# Patient Record
Sex: FEMALE | Race: OTHER
[De-identification: names, ages, dates, MRNs, and addresses within clinical notes are randomized per-mention and may not be internally consistent; named-entity substitution may affect disease eponyms.]

---

## 2019-05-29 ENCOUNTER — APPOINTMENT (OUTPATIENT)
Dept: OBGYN | Facility: CLINIC | Age: 49
End: 2019-05-29
Payer: COMMERCIAL

## 2019-05-29 VITALS
SYSTOLIC BLOOD PRESSURE: 140 MMHG | HEIGHT: 59 IN | BODY MASS INDEX: 34.27 KG/M2 | DIASTOLIC BLOOD PRESSURE: 80 MMHG | WEIGHT: 170 LBS

## 2019-05-29 DIAGNOSIS — G43.909 MIGRAINE, UNSPECIFIED, NOT INTRACTABLE, W/OUT STATUS MIGRAINOSUS: ICD-10-CM

## 2019-05-29 DIAGNOSIS — J45.909 UNSPECIFIED ASTHMA, UNCOMPLICATED: ICD-10-CM

## 2019-05-29 DIAGNOSIS — Z00.00 ENCOUNTER FOR GENERAL ADULT MEDICAL EXAMINATION W/OUT ABNORMAL FINDINGS: ICD-10-CM

## 2019-05-29 DIAGNOSIS — N95.1 MENOPAUSAL AND FEMALE CLIMACTERIC STATES: ICD-10-CM

## 2019-05-29 DIAGNOSIS — E66.9 OBESITY, UNSPECIFIED: ICD-10-CM

## 2019-05-29 DIAGNOSIS — Z87.410 PERSONAL HISTORY OF CERVICAL DYSPLASIA: ICD-10-CM

## 2019-05-29 DIAGNOSIS — Z87.39 PERSONAL HISTORY OF OTHER DISEASES OF THE MUSCULOSKELETAL SYSTEM AND CONNECTIVE TISSUE: ICD-10-CM

## 2019-05-29 DIAGNOSIS — Z78.9 OTHER SPECIFIED HEALTH STATUS: ICD-10-CM

## 2019-05-29 PROCEDURE — 99202 OFFICE O/P NEW SF 15 MIN: CPT

## 2019-05-29 RX ORDER — CETIRIZINE HYDROCHLORIDE 10 MG/1
10 TABLET, FILM COATED ORAL
Refills: 0 | Status: ACTIVE | COMMUNITY

## 2019-05-29 RX ORDER — FLUTICASONE PROPIONATE 50 MCG
50 SPRAY, SUSPENSION NASAL
Refills: 0 | Status: ACTIVE | COMMUNITY

## 2019-05-29 RX ORDER — MONTELUKAST 10 MG/1
TABLET, FILM COATED ORAL
Refills: 0 | Status: ACTIVE | COMMUNITY

## 2019-05-29 RX ORDER — OMEPRAZOLE 40 MG/1
40 CAPSULE, DELAYED RELEASE ORAL
Refills: 0 | Status: ACTIVE | COMMUNITY

## 2019-05-29 RX ORDER — NAPROXEN 500 MG/1
500 TABLET ORAL
Refills: 0 | Status: ACTIVE | COMMUNITY

## 2019-05-29 RX ORDER — CONJUGATED ESTROGENS AND MEDROXYPROGESTERONE ACETATE .3; 1.5 MG/1; MG/1
0.3-1.5 TABLET, SUGAR COATED ORAL
Refills: 0 | Status: ACTIVE | COMMUNITY

## 2019-05-29 RX ORDER — ALBUTEROL SULFATE 90 UG/1
108 (90 BASE) AEROSOL, METERED RESPIRATORY (INHALATION)
Refills: 0 | Status: ACTIVE | COMMUNITY

## 2019-05-29 RX ORDER — BUDESONIDE AND FORMOTEROL FUMARATE DIHYDRATE 80; 4.5 UG/1; UG/1
80-4.5 AEROSOL RESPIRATORY (INHALATION)
Refills: 0 | Status: ACTIVE | COMMUNITY

## 2019-05-29 NOTE — HISTORY OF PRESENT ILLNESS
[Definite:  ___ (Date)] : the last menstrual period was [unfilled] [Normal Amount/Duration] : was of a normal amount and duration [Menarche Age: ____] : age at menarche was [unfilled] [Menstrual Cramps] : menstrual cramps [Last Pap ___] : Last cervical pap smear was [unfilled] [Hot Flashes] : hot flashes [Hormonal Therapy] : relieved by hormonal therapy [Mood Changes] : mood changes [Sexually Active] : is sexually active [Last Mammogram ___] : Last Mammogram was [unfilled] [Night Sweats] : no night sweats [Vaginal Itching] : no vaginal itching [Dyspareunia] : no dyspareunia [Spotting Between  Menses] : no spotting between menses [Regular Cycle Intervals] : periods have been irregular

## 2019-10-08 ENCOUNTER — INPATIENT (INPATIENT)
Facility: HOSPITAL | Age: 49
LOS: 0 days | Discharge: HOME | DRG: 301 | End: 2019-10-09
Attending: PSYCHIATRY & NEUROLOGY | Admitting: PSYCHIATRY & NEUROLOGY
Payer: COMMERCIAL

## 2019-10-08 VITALS
DIASTOLIC BLOOD PRESSURE: 78 MMHG | SYSTOLIC BLOOD PRESSURE: 129 MMHG | RESPIRATION RATE: 16 BRPM | OXYGEN SATURATION: 98 % | HEART RATE: 77 BPM | WEIGHT: 169.09 LBS

## 2019-10-08 DIAGNOSIS — E66.9 OBESITY, UNSPECIFIED: ICD-10-CM

## 2019-10-08 DIAGNOSIS — Z88.0 ALLERGY STATUS TO PENICILLIN: ICD-10-CM

## 2019-10-08 DIAGNOSIS — G43.909 MIGRAINE, UNSPECIFIED, NOT INTRACTABLE, WITHOUT STATUS MIGRAINOSUS: ICD-10-CM

## 2019-10-08 DIAGNOSIS — R07.9 CHEST PAIN, UNSPECIFIED: ICD-10-CM

## 2019-10-08 DIAGNOSIS — G51.0 BELL'S PALSY: ICD-10-CM

## 2019-10-08 DIAGNOSIS — Z98.84 BARIATRIC SURGERY STATUS: Chronic | ICD-10-CM

## 2019-10-08 DIAGNOSIS — J45.909 UNSPECIFIED ASTHMA, UNCOMPLICATED: ICD-10-CM

## 2019-10-08 DIAGNOSIS — Z98.84 BARIATRIC SURGERY STATUS: ICD-10-CM

## 2019-10-08 DIAGNOSIS — I77.74 DISSECTION OF VERTEBRAL ARTERY: ICD-10-CM

## 2019-10-08 LAB
ALBUMIN SERPL ELPH-MCNC: 4 G/DL — SIGNIFICANT CHANGE UP (ref 3.4–5)
ALP SERPL-CCNC: 80 U/L — SIGNIFICANT CHANGE UP (ref 40–120)
ALT FLD-CCNC: 22 U/L — SIGNIFICANT CHANGE UP (ref 12–42)
ANION GAP SERPL CALC-SCNC: 5 MMOL/L — LOW (ref 9–16)
APTT BLD: 33.2 SEC — SIGNIFICANT CHANGE UP (ref 27.5–36.3)
AST SERPL-CCNC: 24 U/L — SIGNIFICANT CHANGE UP (ref 15–37)
BASOPHILS NFR BLD AUTO: 1.1 % — SIGNIFICANT CHANGE UP (ref 0–2)
BILIRUB SERPL-MCNC: 0.3 MG/DL — SIGNIFICANT CHANGE UP (ref 0.2–1.2)
BUN SERPL-MCNC: 6 MG/DL — LOW (ref 7–23)
CALCIUM SERPL-MCNC: 9 MG/DL — SIGNIFICANT CHANGE UP (ref 8.5–10.5)
CHLORIDE SERPL-SCNC: 108 MMOL/L — SIGNIFICANT CHANGE UP (ref 96–108)
CO2 SERPL-SCNC: 30 MMOL/L — SIGNIFICANT CHANGE UP (ref 22–31)
CREAT SERPL-MCNC: 0.75 MG/DL — SIGNIFICANT CHANGE UP (ref 0.5–1.3)
EOSINOPHIL NFR BLD AUTO: 1.5 % — SIGNIFICANT CHANGE UP (ref 0–6)
FLU A RESULT: SIGNIFICANT CHANGE UP
FLU A RESULT: SIGNIFICANT CHANGE UP
FLUAV AG NPH QL: SIGNIFICANT CHANGE UP
FLUBV AG NPH QL: SIGNIFICANT CHANGE UP
GLUCOSE SERPL-MCNC: 95 MG/DL — SIGNIFICANT CHANGE UP (ref 70–99)
HCG SERPL-ACNC: 1 MIU/ML — SIGNIFICANT CHANGE UP
HCT VFR BLD CALC: 37.5 % — SIGNIFICANT CHANGE UP (ref 34.5–45)
HGB BLD-MCNC: 12.3 G/DL — SIGNIFICANT CHANGE UP (ref 11.5–15.5)
IMM GRANULOCYTES NFR BLD AUTO: 0.2 % — SIGNIFICANT CHANGE UP (ref 0–1.5)
INR BLD: 1.08 — SIGNIFICANT CHANGE UP (ref 0.88–1.16)
LYMPHOCYTES # BLD AUTO: 28.9 % — SIGNIFICANT CHANGE UP (ref 13–44)
MAGNESIUM SERPL-MCNC: 1.8 MG/DL — SIGNIFICANT CHANGE UP (ref 1.6–2.6)
MCHC RBC-ENTMCNC: 27.6 PG — SIGNIFICANT CHANGE UP (ref 27–34)
MCHC RBC-ENTMCNC: 32.8 G/DL — SIGNIFICANT CHANGE UP (ref 32–36)
MCV RBC AUTO: 84.3 FL — SIGNIFICANT CHANGE UP (ref 80–100)
MONOCYTES NFR BLD AUTO: 7.1 % — SIGNIFICANT CHANGE UP (ref 2–14)
NEUTROPHILS NFR BLD AUTO: 61.2 % — SIGNIFICANT CHANGE UP (ref 43–77)
NT-PROBNP SERPL-SCNC: 61 PG/ML — SIGNIFICANT CHANGE UP
PLATELET # BLD AUTO: 355 K/UL — SIGNIFICANT CHANGE UP (ref 150–400)
POTASSIUM SERPL-MCNC: 3.8 MMOL/L — SIGNIFICANT CHANGE UP (ref 3.5–5.3)
POTASSIUM SERPL-SCNC: 3.8 MMOL/L — SIGNIFICANT CHANGE UP (ref 3.5–5.3)
PROT SERPL-MCNC: 7.8 G/DL — SIGNIFICANT CHANGE UP (ref 6.4–8.2)
PROTHROM AB SERPL-ACNC: 12 SEC — SIGNIFICANT CHANGE UP (ref 10–12.9)
RBC # BLD: 4.45 M/UL — SIGNIFICANT CHANGE UP (ref 3.8–5.2)
RBC # FLD: 15.5 % — HIGH (ref 10.3–14.5)
RSV RESULT: SIGNIFICANT CHANGE UP
RSV RNA RESP QL NAA+PROBE: SIGNIFICANT CHANGE UP
SODIUM SERPL-SCNC: 143 MMOL/L — SIGNIFICANT CHANGE UP (ref 132–145)
TROPONIN I SERPL-MCNC: <0.017 NG/ML — LOW (ref 0.02–0.06)
TSH SERPL-MCNC: 1.79 UIU/ML — SIGNIFICANT CHANGE UP (ref 0.36–3.74)
WBC # BLD: 5.2 K/UL — SIGNIFICANT CHANGE UP (ref 3.8–10.5)
WBC # FLD AUTO: 5.2 K/UL — SIGNIFICANT CHANGE UP (ref 3.8–10.5)

## 2019-10-08 PROCEDURE — 70496 CT ANGIOGRAPHY HEAD: CPT | Mod: 26

## 2019-10-08 PROCEDURE — 71045 X-RAY EXAM CHEST 1 VIEW: CPT | Mod: 26

## 2019-10-08 PROCEDURE — 99285 EMERGENCY DEPT VISIT HI MDM: CPT | Mod: 25

## 2019-10-08 PROCEDURE — 93010 ELECTROCARDIOGRAM REPORT: CPT

## 2019-10-08 PROCEDURE — 70551 MRI BRAIN STEM W/O DYE: CPT | Mod: 26

## 2019-10-08 PROCEDURE — 99223 1ST HOSP IP/OBS HIGH 75: CPT

## 2019-10-08 PROCEDURE — 70498 CT ANGIOGRAPHY NECK: CPT | Mod: 26

## 2019-10-08 PROCEDURE — 70450 CT HEAD/BRAIN W/O DYE: CPT | Mod: 26,59

## 2019-10-08 RX ORDER — ACETAMINOPHEN 500 MG
650 TABLET ORAL ONCE
Refills: 0 | Status: COMPLETED | OUTPATIENT
Start: 2019-10-08 | End: 2019-10-08

## 2019-10-08 RX ORDER — METHOCARBAMOL 500 MG/1
750 TABLET, FILM COATED ORAL ONCE
Refills: 0 | Status: COMPLETED | OUTPATIENT
Start: 2019-10-08 | End: 2019-10-08

## 2019-10-08 RX ORDER — IPRATROPIUM/ALBUTEROL SULFATE 18-103MCG
3 AEROSOL WITH ADAPTER (GRAM) INHALATION ONCE
Refills: 0 | Status: COMPLETED | OUTPATIENT
Start: 2019-10-08 | End: 2019-10-08

## 2019-10-08 RX ORDER — ATORVASTATIN CALCIUM 80 MG/1
80 TABLET, FILM COATED ORAL AT BEDTIME
Refills: 0 | Status: DISCONTINUED | OUTPATIENT
Start: 2019-10-08 | End: 2019-10-09

## 2019-10-08 RX ORDER — FLUTICASONE PROPIONATE 50 MCG
1 SPRAY, SUSPENSION NASAL
Refills: 0 | Status: DISCONTINUED | OUTPATIENT
Start: 2019-10-08 | End: 2019-10-09

## 2019-10-08 RX ORDER — CLOPIDOGREL BISULFATE 75 MG/1
75 TABLET, FILM COATED ORAL DAILY
Refills: 0 | Status: DISCONTINUED | OUTPATIENT
Start: 2019-10-09 | End: 2019-10-09

## 2019-10-08 RX ORDER — METOCLOPRAMIDE HCL 10 MG
10 TABLET ORAL ONCE
Refills: 0 | Status: COMPLETED | OUTPATIENT
Start: 2019-10-08 | End: 2019-10-08

## 2019-10-08 RX ORDER — BUDESONIDE AND FORMOTEROL FUMARATE DIHYDRATE 160; 4.5 UG/1; UG/1
2 AEROSOL RESPIRATORY (INHALATION)
Refills: 0 | Status: DISCONTINUED | OUTPATIENT
Start: 2019-10-08 | End: 2019-10-09

## 2019-10-08 RX ORDER — CLOPIDOGREL BISULFATE 75 MG/1
300 TABLET, FILM COATED ORAL ONCE
Refills: 0 | Status: COMPLETED | OUTPATIENT
Start: 2019-10-08 | End: 2019-10-08

## 2019-10-08 RX ORDER — ASPIRIN/CALCIUM CARB/MAGNESIUM 324 MG
325 TABLET ORAL ONCE
Refills: 0 | Status: COMPLETED | OUTPATIENT
Start: 2019-10-08 | End: 2019-10-08

## 2019-10-08 RX ORDER — ENOXAPARIN SODIUM 100 MG/ML
40 INJECTION SUBCUTANEOUS EVERY 24 HOURS
Refills: 0 | Status: DISCONTINUED | OUTPATIENT
Start: 2019-10-08 | End: 2019-10-09

## 2019-10-08 RX ORDER — ASPIRIN/CALCIUM CARB/MAGNESIUM 324 MG
81 TABLET ORAL DAILY
Refills: 0 | Status: DISCONTINUED | OUTPATIENT
Start: 2019-10-09 | End: 2019-10-09

## 2019-10-08 RX ORDER — CLOPIDOGREL BISULFATE 75 MG/1
300 TABLET, FILM COATED ORAL ONCE
Refills: 0 | Status: DISCONTINUED | OUTPATIENT
Start: 2019-10-08 | End: 2019-10-08

## 2019-10-08 RX ORDER — IPRATROPIUM/ALBUTEROL SULFATE 18-103MCG
3 AEROSOL WITH ADAPTER (GRAM) INHALATION EVERY 4 HOURS
Refills: 0 | Status: DISCONTINUED | OUTPATIENT
Start: 2019-10-08 | End: 2019-10-09

## 2019-10-08 RX ADMIN — ATORVASTATIN CALCIUM 80 MILLIGRAM(S): 80 TABLET, FILM COATED ORAL at 22:42

## 2019-10-08 RX ADMIN — Medication 650 MILLIGRAM(S): at 12:26

## 2019-10-08 RX ADMIN — Medication 325 MILLIGRAM(S): at 17:26

## 2019-10-08 RX ADMIN — Medication 125 MILLIGRAM(S): at 12:26

## 2019-10-08 RX ADMIN — METHOCARBAMOL 750 MILLIGRAM(S): 500 TABLET, FILM COATED ORAL at 12:26

## 2019-10-08 RX ADMIN — Medication 650 MILLIGRAM(S): at 12:56

## 2019-10-08 RX ADMIN — CLOPIDOGREL BISULFATE 300 MILLIGRAM(S): 75 TABLET, FILM COATED ORAL at 17:26

## 2019-10-08 RX ADMIN — Medication 3 MILLILITER(S): at 12:25

## 2019-10-08 RX ADMIN — Medication 10 MILLIGRAM(S): at 12:25

## 2019-10-08 NOTE — ED PROVIDER NOTE - DIAGNOSTIC INTERPRETATION
Interpreted by ED physician: DR. Ceron  Chest x-ray, 1 view  Lungs clear, heart shadow normal, bony structures normal, no free air under diaphragm, no PTX

## 2019-10-08 NOTE — H&P ADULT - ASSESSMENT
50 y/o female PMH migraines, bell's palsy, asthma who presented with 2 days of left sided numbness/weakness/pain and chest tightness/SOB found to have "subtle and thin filling defect" of right V2 artery that "may be sequela of dissection, age-indeterminate," pending further workup. 50 y/o female PMH migraines, bell's palsy, asthma, julio cesar-menopausal on OCP who presented with 2 days of left sided numbness/weakness/pain and chest tightness/SOB found to have "subtle and thin filling defect" of right V2 artery that "may be sequela of dissection, age-indeterminate," pending further workup.

## 2019-10-08 NOTE — H&P ADULT - NSHPLABSRESULTS_GEN_ALL_CORE
LABS:                         12.3   5.2   )-----------( 355      ( 08 Oct 2019 12:10 )             37.5     10-08    143  |  108  |  6<L>  ----------------------------<  95  3.8   |  30  |  0.75    Ca    9.0      08 Oct 2019 12:10  Mg     1.8     10-08    TPro  7.8  /  Alb  4.0  /  TBili  0.3  /  DBili  x   /  AST  24  /  ALT  22  /  AlkPhos  80  10-08    PT/INR - ( 08 Oct 2019 12:10 )   PT: 12.0 sec;   INR: 1.08          PTT - ( 08 Oct 2019 12:10 )  PTT:33.2 sec    CARDIAC MARKERS ( 08 Oct 2019 12:10 )  <0.017 ng/mL / x     / x     / x     / x          Serum Pro-Brain Natriuretic Peptide: 61 pg/mL (10-08 @ 12:10)        RADIOLOGY, EKG & ADDITIONAL TESTS:     CXR Frontal view of the chest demonstrates normal heart size. No   consolidation. No pleural effusion. No active chest disease.  IMPRESSION:    No acute intracranial hemorrhage or CT evidence of acute transcortical   infarction.  CTA:   1. Intracranially, there is no large vessel occlusion   or high-grade stenosis.  2. Extracranially: Carotid arteries in the neck are   normal. The vertebral arteries are patent. There is subtle and thin   filling defect perceived of the right vertebral artery V2 segment (series   3, image 226; series 2, image 57) that may be sequela of dissection   injury, age-indeterminate. There is clefted appearance on 3D rendering   (see key image in PACS). The left vertebral artery loops into the left   C3-4 neural foramen and may possible impinge the left C4 nerve root. LABS:                         12.3   5.2   )-----------( 355      ( 08 Oct 2019 12:10 )             37.5     10-08    143  |  108  |  6<L>  ----------------------------<  95  3.8   |  30  |  0.75    Ca    9.0      08 Oct 2019 12:10  Mg     1.8     10-08    TPro  7.8  /  Alb  4.0  /  TBili  0.3  /  DBili  x   /  AST  24  /  ALT  22  /  AlkPhos  80  10-08    PT/INR - ( 08 Oct 2019 12:10 )   PT: 12.0 sec;   INR: 1.08          PTT - ( 08 Oct 2019 12:10 )  PTT:33.2 sec    CARDIAC MARKERS ( 08 Oct 2019 12:10 )  <0.017 ng/mL / x     / x     / x     / x          Serum Pro-Brain Natriuretic Peptide: 61 pg/mL (10-08 @ 12:10)        RADIOLOGY, EKG & ADDITIONAL TESTS:     CXR Frontal view of the chest demonstrates normal heart size. No   consolidation. No pleural effusion. No active chest disease.  IMPRESSION:    No acute intracranial hemorrhage or CT evidence of acute transcortical   infarction.  CTA:   1. Intracranially, there is no large vessel occlusion   or high-grade stenosis.  2. Extracranially: Carotid arteries in the neck are   normal. The vertebral arteries are patent. There is subtle and thin   filling defect perceived of the right vertebral artery V2 segment (series   3, image 226; series 2, image 57) that may be sequela of dissection   injury, age-indeterminate. There is clefted appearance on 3D rendering   (see key image in PACS). The left vertebral artery loops into the left   C3-4 neural foramen and may possible impinge the left C4 nerve root.    EKG: NSR at 76

## 2019-10-08 NOTE — H&P ADULT - PROBLEM SELECTOR PLAN 3
s/p lab band surgery april 2019  regular diet but needs to cut up food into very small pieces recurrent migraines, worse in past 2 weeks  associated with nausea/phonophobia/photophobia  normally treats with caffeine  prn management with tylenol/caffeine

## 2019-10-08 NOTE — ED ADULT NURSE NOTE - OBJECTIVE STATEMENT
AOX4 +ambulatory patient reports L sided arm and chest pain started last night. Patient states the pain on her L arm started last night and she took 2 ASA and got better but when she woke up this morning her pain got worse. Patient denies any fevers or chills

## 2019-10-08 NOTE — H&P ADULT - PROBLEM SELECTOR PLAN 6
F: None  E: replete K>4, Mg <2  N: regular   DVT: Lovenox, SCDs  PPI: Not needed  DISPO: 7LACH  FULL CODE

## 2019-10-08 NOTE — ED PROVIDER NOTE - MUSCULOSKELETAL, MLM
Spine appears normal, range of motion is not limited, no muscle or joint tenderness. Normal +2 pulses and capillary refill on all extremities.

## 2019-10-08 NOTE — PATIENT PROFILE ADULT - NSPROPTRIGHTSUPPORTPERSON_GEN_A_NUR
Nose with pink mucosa and no drainage.  Mouth mucous membranes moist and pink.  No tenderness or swelling to throat or neck. same name as above

## 2019-10-08 NOTE — H&P ADULT - PROBLEM SELECTOR PLAN 1
"subtle and thin filling defect" of right V2 artery that "may be sequela of dissection, age-indeterminate with left sided numbness (face, arm, leg) persisting   -pending final read of MR brain noncon  -pending TTE  -tele monitoring  -aspirin 81, lipitor 80, plavix 75 daily  -dysphagia screen - passed  -PT/OT/SLP  -SCDs/lovenox "subtle and thin filling defect" of right V2 artery that "may be sequela of dissection, age-indeterminate with left sided numbness (face, arm, leg) persisting   -pending final read of MR brain noncon  -pending TTE  -tele monitoring  -aspirin 81, lipitor 80, plavix 75 daily  -dysphagia screen - passed  -PT/OT/SLP  -SCDs/lovenox  -TSH, A1C, lipid panel "subtle and thin filling defect" of right V2 artery that "may be sequela of dissection, age-indeterminate with left sided numbness (face, arm, leg) persisting   -pending final read of MR brain noncon  -pending TTE  -tele monitoring  -aspirin 81, lipitor 80, plavix 75 daily  -dysphagia screen - passed  -PT/OT/SLP  -SCDs/lovenox  -TSH, A1C, lipid panel  -consider d/c of OCP -- hold for now

## 2019-10-08 NOTE — H&P ADULT - PROBLEM SELECTOR PLAN 5
1) PCP Contacted on Admission: (No) --> Name & Phone #: Garrison Nielsen ([phone])  2) Date of Contact with PCP:   3) PCP Contacted at Discharge: (Y/N, N/A)  4) Summary of Handoff Given to PCP:   5) Post-Discharge Appointment Date and Location: s/p lab band surgery april 2019  regular diet but needs to cut up food into very small pieces

## 2019-10-08 NOTE — H&P ADULT - PROBLEM SELECTOR PLAN 2
Appears to be in mild exacerbation with decreased air movement but no wheezing  Per patient, not at baseline and experiencing some chest tightness  C/w Duonebs q4  C/w home Symbicort BID, flonase, singulair, zyrtec  S/p solumedrol 125 IV

## 2019-10-08 NOTE — H&P ADULT - PROBLEM SELECTOR PLAN 4
F: None  E: replete K>4, Mg <2  N: regular   DVT: Lovenox, SCDs  PPI: Not needed  DISPO: 7LACH  FULL CODE on Prempro (combination OCP) for hot flashes  hold for now  consider alternative therapies for hot flashes like SSRI  outpt f/u with Dr. Tasha Rivera

## 2019-10-08 NOTE — H&P ADULT - HISTORY OF PRESENT ILLNESS
48 y/o female PMH asthma, Bell's palsy, migraines who presents with 2 days of left sided pain/numbness/tingling. Yesterday at 10am patient noticed sharp pain in her left arm. She took 2 baby aspirin and felt better. By bedtime the arm pain returned with chest pain and chest tightness. She did a nebulizer and 2 more baby aspirin and felt better. She woke up at 2 am with chest tightness and took another nebulizer. In the morning she felt well but by the time she got to work she was short of breath at rest and with exertion, had numbness/tingling in her left arm and leg, chest pain/tightness and went to . Patient notes that for the past 2 weeks she has had migraines (pounding headache w/ nausea/photophobia/phonophobia) about 3 days/week. Normally they resolve with caffeine but these headaches were more persistent. Patient notes she felt like her left leg was weak yesterday. She also reports 2 episodes of "heart racing" in the past 2 weeks without palpitations. Patient denies changes in speech or vision, facial droop. Currently still has numbness in her left arm and some chest tightness.    ED vitals: p 77, /78, RR 16, 98% on RA, temp 98.2  ED labs: no abnormalities   Imaging:  CXR Frontal view of the chest demonstrates normal heart size. No   consolidation. No pleural effusion. No active chest disease.  IMPRESSION:    No acute intracranial hemorrhage or CT evidence of acute transcortical   infarction.  CTA:   1. Intracranially, there is no large vessel occlusion   or high-grade stenosis.  2. Extracranially: Carotid arteries in the neck are   normal. The vertebral arteries are patent. There is subtle and thin   filling defect perceived of the right vertebral artery V2 segment (series   3, image 226; series 2, image 57) that may be sequela of dissection   injury, age-indeterminate. There is clefted appearance on 3D rendering   (see key image in PACS). The left vertebral artery loops into the left   C3-4 neural foramen and may possible impinge the left C4 nerve root. 48 y/o female PMH asthma, Bell's palsy, migraines who presents with 2 days of left sided pain/numbness/tingling. Yesterday at 10am patient noticed sharp pain in her left arm. She took 2 baby aspirin and felt better. By bedtime the arm pain returned with chest pain and chest tightness. She did a nebulizer and 2 more baby aspirin and felt better. She woke up at 2 am with chest tightness and took another nebulizer. In the morning she felt well but by the time she got to work she was short of breath at rest and with exertion, had numbness/tingling in her left arm and leg, chest pain/tightness and went to . Patient notes that for the past 2 weeks she has had migraines (pounding headache w/ nausea/photophobia/phonophobia) about 3 days/week. Normally they resolve with caffeine but these headaches were more persistent. Patient notes she felt like her left leg was weak yesterday. She also reports 2 episodes of "heart racing" in the past 2 weeks without palpitations. Patient denies changes in speech or vision, facial droop. Currently still has numbness in her left arm and some chest tightness.    ED vitals: p 77, /78, RR 16, 98% on RA, temp 98.2  ED labs: no abnormalities   ED meds: aspirin 325, plavix 300, duoneb x 1, robaxin 750 po, reglan 10 IV, solumedrol 125 IV  Imaging:  CXR Frontal view of the chest demonstrates normal heart size. No   consolidation. No pleural effusion. No active chest disease.  IMPRESSION:    No acute intracranial hemorrhage or CT evidence of acute transcortical   infarction.  CTA:   1. Intracranially, there is no large vessel occlusion   or high-grade stenosis.  2. Extracranially: Carotid arteries in the neck are   normal. The vertebral arteries are patent. There is subtle and thin   filling defect perceived of the right vertebral artery V2 segment (series   3, image 226; series 2, image 57) that may be sequela of dissection   injury, age-indeterminate. There is clefted appearance on 3D rendering   (see key image in PACS). The left vertebral artery loops into the left   C3-4 neural foramen and may possible impinge the left C4 nerve root. 50 y/o female PMH asthma (hospitalized, never intubated), Bell's palsy, migraines who presents with 2 days of left sided pain/numbness/tingling. Yesterday at 10am patient noticed sharp pain in her left arm. She took 2 baby aspirin and felt better. By bedtime the arm pain returned with chest pain and chest tightness. She did a nebulizer and 2 more baby aspirin and felt better. She woke up at 2 am with chest tightness and took another nebulizer. In the morning she felt well but by the time she got to work she was short of breath at rest and with exertion, had numbness/tingling in her left arm and leg, chest pain/tightness and went to . Patient notes that for the past 2 weeks she has had migraines (pounding headache w/ nausea/photophobia/phonophobia) about 3 days/week. Normally they resolve with caffeine but these headaches were more persistent. Patient notes she felt like her left leg was weak yesterday. She also reports 2 episodes of "heart racing" in the past 2 weeks without palpitations. Patient denies changes in speech or vision, facial droop. Currently still has numbness in her left arm and some chest tightness.    ED vitals: p 77, /78, RR 16, 98% on RA, temp 98.2  ED labs: no abnormalities   ED meds: aspirin 325, plavix 300, duoneb x 1, robaxin 750 po, reglan 10 IV, solumedrol 125 IV  Imaging:  CXR Frontal view of the chest demonstrates normal heart size. No   consolidation. No pleural effusion. No active chest disease.  IMPRESSION:    No acute intracranial hemorrhage or CT evidence of acute transcortical   infarction.  CTA:   1. Intracranially, there is no large vessel occlusion   or high-grade stenosis.  2. Extracranially: Carotid arteries in the neck are   normal. The vertebral arteries are patent. There is subtle and thin   filling defect perceived of the right vertebral artery V2 segment (series   3, image 226; series 2, image 57) that may be sequela of dissection   injury, age-indeterminate. There is clefted appearance on 3D rendering   (see key image in PACS). The left vertebral artery loops into the left   C3-4 neural foramen and may possible impinge the left C4 nerve root. 50 y/o female PMH asthma (hospitalized, never intubated), Bell's palsy, migraines, julio cesar-menopausal on OCP for hot flashes who presents with 2 days of left sided pain/numbness/tingling. Yesterday at 10am patient noticed sharp pain in her left arm. She took 2 baby aspirin and felt better. By bedtime the arm pain returned with chest pain and chest tightness. She did a nebulizer and 2 more baby aspirin and felt better. She woke up at 2 am with chest tightness and took another nebulizer. In the morning she felt well but by the time she got to work she was short of breath at rest and with exertion, had numbness/tingling in her left arm and leg, chest pain/tightness and went to . Patient notes that for the past 2 weeks she has had migraines (pounding headache w/ nausea/photophobia/phonophobia) about 3 days/week. Normally they resolve with caffeine but these headaches were more persistent. Patient notes she felt like her left leg was weak yesterday. She also reports 2 episodes of "heart racing" in the past 2 weeks without palpitations. Patient denies changes in speech or vision, facial droop. Currently still has numbness in her left arm and some chest tightness.    ED vitals: p 77, /78, RR 16, 98% on RA, temp 98.2  ED labs: no abnormalities   ED meds: aspirin 325, plavix 300, duoneb x 1, robaxin 750 po, reglan 10 IV, solumedrol 125 IV  Imaging:  CXR Frontal view of the chest demonstrates normal heart size. No   consolidation. No pleural effusion. No active chest disease.  IMPRESSION:    No acute intracranial hemorrhage or CT evidence of acute transcortical   infarction.  CTA:   1. Intracranially, there is no large vessel occlusion   or high-grade stenosis.  2. Extracranially: Carotid arteries in the neck are   normal. The vertebral arteries are patent. There is subtle and thin   filling defect perceived of the right vertebral artery V2 segment (series   3, image 226; series 2, image 57) that may be sequela of dissection   injury, age-indeterminate. There is clefted appearance on 3D rendering   (see key image in PACS). The left vertebral artery loops into the left   C3-4 neural foramen and may possible impinge the left C4 nerve root. 48 y/o female PMH asthma (hospitalized, never intubated), Bell's palsy, migraines, julio cesar-menopausal on OCP for hot flashes transported from [facility] with 2 days of left sided pain/numbness/tingling. Yesterday at 10am patient noticed sharp pain in her left arm. She took 2 baby aspirin and felt better. By bedtime the arm pain returned with chest pain and chest tightness. She did a nebulizer and 2 more baby aspirin and felt better. She woke up at 2 am with chest tightness and took another nebulizer. In the morning she felt well but by the time she got to work she was short of breath at rest and with exertion, had numbness/tingling in her left arm and leg, chest pain/tightness and went to . Patient notes that for the past 2 weeks she has had migraines (pounding headache w/ nausea/photophobia/phonophobia) about 3 days/week. Normally they resolve with caffeine but these headaches were more persistent. Patient notes she felt like her left leg was weak yesterday. She also reports 2 episodes of "heart racing" in the past 2 weeks without palpitations. Patient denies changes in speech or vision, facial droop. Currently still has numbness in her left arm and some chest tightness. Upon arrival to [facility], NIHSS was 1.     ED vitals: p 77, /78, RR 16, 98% on RA, temp 98.2  ED labs: no abnormalities   ED meds: aspirin 325, plavix 300, duoneb x 1, robaxin 750 po, reglan 10 IV, solumedrol 125 IV  Imaging:  CXR Frontal view of the chest demonstrates normal heart size. No   consolidation. No pleural effusion. No active chest disease.  IMPRESSION:    No acute intracranial hemorrhage or CT evidence of acute transcortical   infarction.  CTA:   1. Intracranially, there is no large vessel occlusion   or high-grade stenosis.  2. Extracranially: Carotid arteries in the neck are   normal. The vertebral arteries are patent. There is subtle and thin   filling defect perceived of the right vertebral artery V2 segment (series   3, image 226; series 2, image 57) that may be sequela of dissection   injury, age-indeterminate. There is clefted appearance on 3D rendering   (see key image in PACS). The left vertebral artery loops into the left   C3-4 neural foramen and may possible impinge the left C4 nerve root.  EKG: NSR

## 2019-10-08 NOTE — H&P ADULT - NSICDXFAMILYHX_GEN_ALL_CORE_FT
FAMILY HISTORY:  Family history of CVA, mat GM  FH: CAD (coronary artery disease)  FH: hyperlipidemia  FH: hypertension  FH: type 2 diabetes

## 2019-10-08 NOTE — H&P ADULT - NSHPSOCIALHISTORY_GEN_ALL_CORE
Unmarried, has male partner. 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 Finished three years of college. Denies use of alcohol, tobacco, or other drugs.

## 2019-10-08 NOTE — H&P ADULT - NSHPPHYSICALEXAM_GEN_ALL_CORE
VITAL SIGNS:  T(C): 36.7 (10-08-19 @ 17:29), Max: 36.8 (10-08-19 @ 13:39)  T(F): 98.1 (10-08-19 @ 17:29), Max: 98.2 (10-08-19 @ 13:39)  HR: 91 (10-08-19 @ 22:32) (68 - 91)  BP: 149/67 (10-08-19 @ 22:32) (120/74 - 149/67)  BP(mean): 96 (10-08-19 @ 22:32) (88 - 96)  RR: 17 (10-08-19 @ 22:32) (16 - 18)  SpO2: 96% (10-08-19 @ 22:32) (95% - 100%)  Wt(kg): --    PHYSICAL EXAM:    Constitutional: WDWN, lying comfortably in bed, NAD  Head: Nc/At  Eyes: PERRL, EOMI, clear conjunctiva  ENT: no nasal discharge; uvula midline, no oropharyngeal erythema or exudates; MMM  Neck: supple; no JVD or thyromegaly  Respiratory: CTA b/l, no wheezes, rales, or rhonchi  Cardiac: +S1/S2, +RRR, no murmurs, rubs, or gallops  Gastrointestinal: soft, non-tender, non-distended, no rebound/guarding, no palpable masses, normoactive bowel sounds x4  Back: spine midline, no bony tenderness or step-offs; no CVAT B/L  Extremities: WWP, no clubbing or cyanosis, no peripheral edema  Musculoskeletal: NROM x4; no joint swelling, tenderness or erythema  Vascular: 2+ radial and DP pulses b/l  Dermatologic: skin warm, dry and intact, no rashes, wounds, or scars  Lymphatic: no submandibular or cervical LAD  Neurologic: AAOx3, CNII-XII grossly intact, no focal deficits  Psychiatric: affect and characteristics of appearance, verbalizations, behaviors are appropriate VITAL SIGNS:  T(C): 36.7 (10-08-19 @ 17:29), Max: 36.8 (10-08-19 @ 13:39)  T(F): 98.1 (10-08-19 @ 17:29), Max: 98.2 (10-08-19 @ 13:39)  HR: 91 (10-08-19 @ 22:32) (68 - 91)  BP: 149/67 (10-08-19 @ 22:32) (120/74 - 149/67)  BP(mean): 96 (10-08-19 @ 22:32) (88 - 96)  RR: 17 (10-08-19 @ 22:32) (16 - 18)  SpO2: 96% (10-08-19 @ 22:32) (95% - 100%)    PHYSICAL EXAM:  Constitutional: WDWN, lying comfortably in bed, NAD  Head: NC/AT  Eyes: PERRL, EOMI, clear conjunctiva  ENT: no nasal discharge; uvula midline, no oropharyngeal erythema or exudates; MMM  Neck: supple; no JVD or thyromegaly  Respiratory: Decreased breath sounds with prolonged expiratory phase, no higinio wheezing   Cardiac: +S1/S2, +RRR, no murmurs, rubs, or gallops  Gastrointestinal: soft, non-tender, non-distended, no rebound/guarding, no palpable masses, normoactive bowel sounds x4  Extremities: WWP, no clubbing or cyanosis, no peripheral edema  Musculoskeletal: NROM x4; no joint swelling, tenderness or erythema  Vascular: 2+ radial and DP pulses b/l  Dermatologic: skin warm, dry and intact, no rashes, wounds, or scars  Lymphatic: no submandibular or cervical LAD  Neurologic:     -Mental Status: AAOx3. Speech is fluent with intact naming, repetition, and comprehension, no dysarthria. Recent and remote memory intact. Follows commands. Attention/concentration intact. Fund of knowledge appropriate.     -Cranial Nerves:          II: Visual fields are full to confrontation.          III, IV, VI: EOMI without nystagmus. PERRL b/l          V:  Facial sensation V1, V2 reduced on left, V3 equal and full b/l          VII: Face is symmetric with normal eye closure and smile          IX, X: Uvula is midline and soft palate rises symmetrically          XI: Head turning and shoulder shrug are intact.          XII: Tongue protrudes midline     -Motor: Normal bulk and tone. Strength bilateral upper extremity 5/5 but decreased on left, bilateral lower extremities 5/5.   Hand  decreased on left                    No pronator drift. Rapid alternating movements intact but slower on left     -Sensation: Decreased on left (100% on right, 35% on left) upper and lower extremities     -Coordination: No dysmetria on finger-to-nose     -Reflexes: Downgoing toes bilaterally      -Gait: Not visualized

## 2019-10-08 NOTE — H&P ADULT - PROBLEM SELECTOR PLAN 7
1) PCP Contacted on Admission: (No) --> Name & Phone #: Garrison Nielsen ([phone])  2) Date of Contact with PCP:   3) PCP Contacted at Discharge: (Y/N, N/A)  4) Summary of Handoff Given to PCP:   5) Post-Discharge Appointment Date and Location:

## 2019-10-08 NOTE — ED ADULT TRIAGE NOTE - CHIEF COMPLAINT QUOTE
Biba from  for left arm pain, numbness and tingling x24hrs and left sided chest pain x10 hrs. Given 324mg ASA PTA.

## 2019-10-08 NOTE — ED PROVIDER NOTE - CLINICAL SUMMARY MEDICAL DECISION MAKING FREE TEXT BOX
Pt with several complaints including chest discomfort, most likely related to asthma/cough. EKG non ischemic. Will order troponin.  R/o cardiac etiology. Pt also with focal weakness in LUE since 10pm last night (13 hours PTA). Will work to r/o CVA presentation otherwise treat symptomatically. Reassess.

## 2019-10-08 NOTE — ED PROVIDER NOTE - OBJECTIVE STATEMENT
49 y o female with PMHx of asthma, gastric band placemine (April 2019), FHX of HTN and cardiac issues (unspecific), and TIA (mother, <50 years old) presents to ED with c/o chest pain since yesterday. While at work at 3 pm yesterday, noted severe left arm pressure/pain/heaviness. She took Aspirin and mildly improved. Pt was able to go on with her day and went home. While at home, she felt SOB and thought it was her asthma exacerbation. Pt took nebulizer treatment and went to bed. At around 3am pt woke up feeling SOB and took another nebulizer dose. At 8:45am she felt sudden returned left arm pain, chest tightness, SOB, left leg numbness and weakness. Notes difficulty ambulating due to leg weakness. Denies slurred speech or blurred vision. Pt had an abnormal EKG at  and was encouraged to visit ED. Notes current chest tightness, arm pain, and leg pain. No syncope, diaphoresis, N/V, palpitations, or other sx. 49 y o female with PMHx of asthma, gastric band placement (April 2019), FHX of HTN and cardiac issues (unspecific), and TIA (mother, <50 years old) presents to ED with c/o chest pain since yesterday. While at work at 3 pm yesterday, noted severe left arm pressure/pain/heaviness. She took Aspirin and mildly improved. Pt was able to go on with her day and went home. While at home, she felt SOB and thought it was her asthma exacerbation. Pt took nebulizer treatment and went to bed. At around 3am pt woke up feeling SOB and took another nebulizer dose. At 8:45am she felt sudden returned left arm pain, chest tightness, SOB, left leg numbness and weakness. Notes difficulty ambulating due to leg weakness. Denies slurred speech or blurred vision. Pt had an abnormal EKG at  and was encouraged to visit ED. Notes current chest tightness, arm pain, and leg pain. No syncope, diaphoresis, N/V, palpitations, or other sx.

## 2019-10-08 NOTE — ED PROVIDER NOTE - PROGRESS NOTE DETAILS
Pt w normal cardiac work up, normal trop, normal EKG, normal CXR, responded to neb/prednisone, no signs of infection on CXR or clinically. neg flu. Neuro work up shows a dissection R vertebral section V2, discussed case w Dr Lindsay, recommends Plavix 300mg, ASA loading. Admit to tele neuro. stable for transfer

## 2019-10-08 NOTE — ED PROVIDER NOTE - NEUROLOGICAL, MLM
Alert and oriented, no focal deficits. + Left arm numbness when compared to the right with weakness, strength 4/5 (unclear if related to pain).

## 2019-10-09 ENCOUNTER — TRANSCRIPTION ENCOUNTER (OUTPATIENT)
Age: 49
End: 2019-10-09

## 2019-10-09 VITALS — TEMPERATURE: 98 F

## 2019-10-09 DIAGNOSIS — I77.74 DISSECTION OF VERTEBRAL ARTERY: ICD-10-CM

## 2019-10-09 DIAGNOSIS — J45.909 UNSPECIFIED ASTHMA, UNCOMPLICATED: ICD-10-CM

## 2019-10-09 DIAGNOSIS — Z98.84 BARIATRIC SURGERY STATUS: ICD-10-CM

## 2019-10-09 DIAGNOSIS — Z98.891 HISTORY OF UTERINE SCAR FROM PREVIOUS SURGERY: Chronic | ICD-10-CM

## 2019-10-09 DIAGNOSIS — R20.0 ANESTHESIA OF SKIN: ICD-10-CM

## 2019-10-09 DIAGNOSIS — N95.1 MENOPAUSAL AND FEMALE CLIMACTERIC STATES: ICD-10-CM

## 2019-10-09 DIAGNOSIS — E66.9 OBESITY, UNSPECIFIED: ICD-10-CM

## 2019-10-09 DIAGNOSIS — Z91.89 OTHER SPECIFIED PERSONAL RISK FACTORS, NOT ELSEWHERE CLASSIFIED: ICD-10-CM

## 2019-10-09 DIAGNOSIS — R63.8 OTHER SYMPTOMS AND SIGNS CONCERNING FOOD AND FLUID INTAKE: ICD-10-CM

## 2019-10-09 DIAGNOSIS — G43.909 MIGRAINE, UNSPECIFIED, NOT INTRACTABLE, WITHOUT STATUS MIGRAINOSUS: ICD-10-CM

## 2019-10-09 LAB
ANION GAP SERPL CALC-SCNC: 12 MMOL/L — SIGNIFICANT CHANGE UP (ref 5–17)
BUN SERPL-MCNC: 8 MG/DL — SIGNIFICANT CHANGE UP (ref 7–23)
CALCIUM SERPL-MCNC: 9.6 MG/DL — SIGNIFICANT CHANGE UP (ref 8.4–10.5)
CHLORIDE SERPL-SCNC: 105 MMOL/L — SIGNIFICANT CHANGE UP (ref 96–108)
CHOLEST SERPL-MCNC: 176 MG/DL — SIGNIFICANT CHANGE UP (ref 10–199)
CO2 SERPL-SCNC: 24 MMOL/L — SIGNIFICANT CHANGE UP (ref 22–31)
CREAT SERPL-MCNC: 0.58 MG/DL — SIGNIFICANT CHANGE UP (ref 0.5–1.3)
GLUCOSE SERPL-MCNC: 133 MG/DL — HIGH (ref 70–99)
HBA1C BLD-MCNC: 5.3 % — SIGNIFICANT CHANGE UP (ref 4–5.6)
HCT VFR BLD CALC: 38.9 % — SIGNIFICANT CHANGE UP (ref 34.5–45)
HDLC SERPL-MCNC: 60 MG/DL — SIGNIFICANT CHANGE UP
HGB BLD-MCNC: 12.3 G/DL — SIGNIFICANT CHANGE UP (ref 11.5–15.5)
LIPID PNL WITH DIRECT LDL SERPL: 102 MG/DL — HIGH
MAGNESIUM SERPL-MCNC: 1.8 MG/DL — SIGNIFICANT CHANGE UP (ref 1.6–2.6)
MCHC RBC-ENTMCNC: 28 PG — SIGNIFICANT CHANGE UP (ref 27–34)
MCHC RBC-ENTMCNC: 31.6 GM/DL — LOW (ref 32–36)
MCV RBC AUTO: 88.6 FL — SIGNIFICANT CHANGE UP (ref 80–100)
NRBC # BLD: 0 /100 WBCS — SIGNIFICANT CHANGE UP (ref 0–0)
PLATELET # BLD AUTO: 350 K/UL — SIGNIFICANT CHANGE UP (ref 150–400)
POTASSIUM SERPL-MCNC: 3.8 MMOL/L — SIGNIFICANT CHANGE UP (ref 3.5–5.3)
POTASSIUM SERPL-SCNC: 3.8 MMOL/L — SIGNIFICANT CHANGE UP (ref 3.5–5.3)
RBC # BLD: 4.39 M/UL — SIGNIFICANT CHANGE UP (ref 3.8–5.2)
RBC # FLD: 15.3 % — HIGH (ref 10.3–14.5)
SODIUM SERPL-SCNC: 141 MMOL/L — SIGNIFICANT CHANGE UP (ref 135–145)
TOTAL CHOLESTEROL/HDL RATIO MEASUREMENT: 2.9 RATIO — LOW (ref 3.3–7.1)
TRIGL SERPL-MCNC: 71 MG/DL — SIGNIFICANT CHANGE UP (ref 10–149)
TSH SERPL-MCNC: 0.55 UIU/ML — SIGNIFICANT CHANGE UP (ref 0.35–4.94)
WBC # BLD: 8.33 K/UL — SIGNIFICANT CHANGE UP (ref 3.8–10.5)
WBC # FLD AUTO: 8.33 K/UL — SIGNIFICANT CHANGE UP (ref 3.8–10.5)

## 2019-10-09 PROCEDURE — 84702 CHORIONIC GONADOTROPIN TEST: CPT

## 2019-10-09 PROCEDURE — 71045 X-RAY EXAM CHEST 1 VIEW: CPT

## 2019-10-09 PROCEDURE — 83036 HEMOGLOBIN GLYCOSYLATED A1C: CPT

## 2019-10-09 PROCEDURE — 85730 THROMBOPLASTIN TIME PARTIAL: CPT

## 2019-10-09 PROCEDURE — 93306 TTE W/DOPPLER COMPLETE: CPT

## 2019-10-09 PROCEDURE — 97162 PT EVAL MOD COMPLEX 30 MIN: CPT

## 2019-10-09 PROCEDURE — 84443 ASSAY THYROID STIM HORMONE: CPT

## 2019-10-09 PROCEDURE — 70551 MRI BRAIN STEM W/O DYE: CPT

## 2019-10-09 PROCEDURE — 99238 HOSP IP/OBS DSCHRG MGMT 30/<: CPT

## 2019-10-09 PROCEDURE — 80048 BASIC METABOLIC PNL TOTAL CA: CPT

## 2019-10-09 PROCEDURE — 99285 EMERGENCY DEPT VISIT HI MDM: CPT | Mod: 25

## 2019-10-09 PROCEDURE — 94640 AIRWAY INHALATION TREATMENT: CPT

## 2019-10-09 PROCEDURE — 36415 COLL VENOUS BLD VENIPUNCTURE: CPT

## 2019-10-09 PROCEDURE — 85610 PROTHROMBIN TIME: CPT

## 2019-10-09 PROCEDURE — 70498 CT ANGIOGRAPHY NECK: CPT

## 2019-10-09 PROCEDURE — 93005 ELECTROCARDIOGRAM TRACING: CPT

## 2019-10-09 PROCEDURE — 80053 COMPREHEN METABOLIC PANEL: CPT

## 2019-10-09 PROCEDURE — 93306 TTE W/DOPPLER COMPLETE: CPT | Mod: 26

## 2019-10-09 PROCEDURE — 83880 ASSAY OF NATRIURETIC PEPTIDE: CPT

## 2019-10-09 PROCEDURE — 80061 LIPID PANEL: CPT

## 2019-10-09 PROCEDURE — 85025 COMPLETE CBC W/AUTO DIFF WBC: CPT

## 2019-10-09 PROCEDURE — 70496 CT ANGIOGRAPHY HEAD: CPT

## 2019-10-09 PROCEDURE — 87631 RESP VIRUS 3-5 TARGETS: CPT

## 2019-10-09 PROCEDURE — 85027 COMPLETE CBC AUTOMATED: CPT

## 2019-10-09 PROCEDURE — 96374 THER/PROPH/DIAG INJ IV PUSH: CPT | Mod: XU

## 2019-10-09 PROCEDURE — 97161 PT EVAL LOW COMPLEX 20 MIN: CPT

## 2019-10-09 PROCEDURE — 93010 ELECTROCARDIOGRAM REPORT: CPT

## 2019-10-09 PROCEDURE — 99255 IP/OBS CONSLTJ NEW/EST HI 80: CPT | Mod: GC

## 2019-10-09 PROCEDURE — 70450 CT HEAD/BRAIN W/O DYE: CPT

## 2019-10-09 PROCEDURE — 83735 ASSAY OF MAGNESIUM: CPT

## 2019-10-09 PROCEDURE — 96375 TX/PRO/DX INJ NEW DRUG ADDON: CPT | Mod: XU

## 2019-10-09 PROCEDURE — 84484 ASSAY OF TROPONIN QUANT: CPT

## 2019-10-09 RX ORDER — ASPIRIN/CALCIUM CARB/MAGNESIUM 324 MG
1 TABLET ORAL
Qty: 0 | Refills: 0 | DISCHARGE
Start: 2019-10-09

## 2019-10-09 RX ORDER — BUDESONIDE AND FORMOTEROL FUMARATE DIHYDRATE 160; 4.5 UG/1; UG/1
2 AEROSOL RESPIRATORY (INHALATION)
Qty: 0 | Refills: 0 | DISCHARGE

## 2019-10-09 RX ORDER — MONTELUKAST 4 MG/1
1 TABLET, CHEWABLE ORAL
Qty: 0 | Refills: 0 | DISCHARGE

## 2019-10-09 RX ORDER — BENZOCAINE AND MENTHOL 5; 1 G/100ML; G/100ML
1 LIQUID ORAL EVERY 6 HOURS
Refills: 0 | Status: DISCONTINUED | OUTPATIENT
Start: 2019-10-09 | End: 2019-10-09

## 2019-10-09 RX ORDER — ATORVASTATIN CALCIUM 80 MG/1
1 TABLET, FILM COATED ORAL
Qty: 30 | Refills: 0
Start: 2019-10-09 | End: 2019-11-07

## 2019-10-09 RX ORDER — CLOPIDOGREL BISULFATE 75 MG/1
1 TABLET, FILM COATED ORAL
Qty: 30 | Refills: 0
Start: 2019-10-09 | End: 2019-11-07

## 2019-10-09 RX ORDER — FLUTICASONE PROPIONATE 50 MCG
1 SPRAY, SUSPENSION NASAL
Qty: 0 | Refills: 0 | DISCHARGE

## 2019-10-09 RX ORDER — CETIRIZINE HYDROCHLORIDE 10 MG/1
1 TABLET ORAL
Qty: 0 | Refills: 0 | DISCHARGE

## 2019-10-09 RX ORDER — ALBUTEROL 90 UG/1
0 AEROSOL, METERED ORAL
Qty: 0 | Refills: 0 | DISCHARGE

## 2019-10-09 RX ORDER — ACETAMINOPHEN 500 MG
650 TABLET ORAL EVERY 6 HOURS
Refills: 0 | Status: DISCONTINUED | OUTPATIENT
Start: 2019-10-09 | End: 2019-10-09

## 2019-10-09 RX ADMIN — Medication 81 MILLIGRAM(S): at 11:49

## 2019-10-09 RX ADMIN — Medication 3 MILLILITER(S): at 00:08

## 2019-10-09 RX ADMIN — Medication 650 MILLIGRAM(S): at 13:38

## 2019-10-09 RX ADMIN — Medication 650 MILLIGRAM(S): at 14:40

## 2019-10-09 RX ADMIN — Medication 3 MILLILITER(S): at 05:04

## 2019-10-09 RX ADMIN — BUDESONIDE AND FORMOTEROL FUMARATE DIHYDRATE 2 PUFF(S): 160; 4.5 AEROSOL RESPIRATORY (INHALATION) at 00:55

## 2019-10-09 RX ADMIN — Medication 3 MILLILITER(S): at 17:53

## 2019-10-09 RX ADMIN — Medication 1 SPRAY(S): at 17:53

## 2019-10-09 RX ADMIN — BUDESONIDE AND FORMOTEROL FUMARATE DIHYDRATE 2 PUFF(S): 160; 4.5 AEROSOL RESPIRATORY (INHALATION) at 05:04

## 2019-10-09 RX ADMIN — Medication 3 MILLILITER(S): at 13:05

## 2019-10-09 RX ADMIN — ENOXAPARIN SODIUM 40 MILLIGRAM(S): 100 INJECTION SUBCUTANEOUS at 00:55

## 2019-10-09 RX ADMIN — BENZOCAINE AND MENTHOL 1 LOZENGE: 5; 1 LIQUID ORAL at 13:39

## 2019-10-09 RX ADMIN — Medication 3 MILLILITER(S): at 09:56

## 2019-10-09 RX ADMIN — BUDESONIDE AND FORMOTEROL FUMARATE DIHYDRATE 2 PUFF(S): 160; 4.5 AEROSOL RESPIRATORY (INHALATION) at 17:53

## 2019-10-09 RX ADMIN — CLOPIDOGREL BISULFATE 75 MILLIGRAM(S): 75 TABLET, FILM COATED ORAL at 11:49

## 2019-10-09 NOTE — DIETITIAN INITIAL EVALUATION ADULT. - ADD RECOMMEND
1) Recommend changing diet to phase 3 Bariatric regular (pt s/p lap band at OSH 6 months ago). 2) Encourage PO intake. 3) Recommend adding chewable multivitamins. 4) Monitor lytes and replete prn.

## 2019-10-09 NOTE — OCCUPATIONAL THERAPY INITIAL EVALUATION ADULT - PLANNED THERAPY INTERVENTIONS, OT EVAL
strengthening/IADL retraining/neuromuscular re-education/ADL retraining/bed mobility training/fine motor coordination training

## 2019-10-09 NOTE — PHYSICAL THERAPY INITIAL EVALUATION ADULT - MODALITIES TREATMENT COMMENTS
decreased cheek puff on the L. eyebrow raise: symmetrical. tongue protrusion: midline. visual tracking (H test): smooth pursuit. visual field test (quadrant test): WNL. No DDK noted.

## 2019-10-09 NOTE — DISCHARGE NOTE NURSING/CASE MANAGEMENT/SOCIAL WORK - PATIENT PORTAL LINK FT
You can access the FollowMyHealth Patient Portal offered by [facility] by registering at the following website: [email or web address]. By joining [employer]’s FollowMyHealth portal, you will also be able to view your health information using other applications (apps) compatible with our system.

## 2019-10-09 NOTE — CONSULT NOTE ADULT - SUBJECTIVE AND OBJECTIVE BOX
HPI:  Brief Hospital Course:  Pt is a 48 y/o female PMH asthma (hospitalized, never intubated), Bell's palsy, migraines, julio cesar-menopausal on OCP for hot flashes transported from [facility] with 2 days of left sided pain/numbness/tingling on 10/8. Upon arrival to [facility], NIHSS was 1. In the ED pt given aspirin 325, plavix 300, duoneb x 1, robaxin 750 po, reglan 10 IV, solumedrol 125 IV. Imaging detailed below.    Subjective:    Allergies    penicillin (Hives)    Intolerances    Home medications:    MEDICATIONS  (STANDING):  ALBUTerol/ipratropium for Nebulization 3 milliLiter(s) Nebulizer every 4 hours  aspirin enteric coated 81 milliGRAM(s) Oral daily  atorvastatin 80 milliGRAM(s) Oral at bedtime  buDESOnide 160 MICROgram(s)/formoterol 4.5 MICROgram(s) Inhaler 2 Puff(s) Inhalation two times a day  clopidogrel Tablet 75 milliGRAM(s) Oral daily  enoxaparin Injectable 40 milliGRAM(s) SubCutaneous every 24 hours  fluticasone propionate 50 MICROgram(s)/spray Nasal Spray 1 Spray(s) Both Nostrils two times a day    MEDICATIONS  (PRN):    Drug Dosing Weight    Weight (kg): 76.7 (08 Oct 2019 10:52)    PAST MEDICAL & SURGICAL HISTORY:  History of Bell's palsy  Asthma  H/O:   Status post gastric banding surgery: 2019    FAMILY HISTORY:  Family history of CVA: mat GM  FH: CAD (coronary artery disease)  FH: hyperlipidemia  FH: type 2 diabetes  FH: hypertension    SOCIAL HISTORY:    ADVANCE DIRECTIVES:    Vital Signs Last 24 Hrs  T(C): 36.8 (09 Oct 2019 06:50), Max: 36.8 (08 Oct 2019 13:39)  T(F): 98.2 (09 Oct 2019 06:50), Max: 98.2 (08 Oct 2019 13:39)  HR: 72 (09 Oct 2019 08:10) (68 - 91)  BP: 114/55 (09 Oct 2019 08:10) (106/59 - 149/67)  BP(mean): 76 (09 Oct 2019 08:10) (76 - 96)  ABP: --  ABP(mean): --  RR: 19 (09 Oct 2019 08:10) (16 - 19)  SpO2: 99% (09 Oct 2019 08:10) (95% - 100%)          I&O's Detail      PHYSICAL EXAM:      Constitutional:    Eyes:    ENMT:    Neck:    Breasts:    Back:    Respiratory:    Cardiovascular:    Gastrointestinal:    Genitourinary:    Rectal:    Extremities:    Vascular:    Neurological:    Skin:    Lymph Nodes:    Musculoskeletal:    Psychiatric:        LABS:  CBC Full  -  ( 09 Oct 2019 07:59 )  WBC Count : 8.33 K/uL  RBC Count : 4.39 M/uL  Hemoglobin : 12.3 g/dL  Hematocrit : 38.9 %  Platelet Count - Automated : 350 K/uL  Mean Cell Volume : 88.6 fl  Mean Cell Hemoglobin : 28.0 pg  Mean Cell Hemoglobin Concentration : 31.6 gm/dL  Auto Neutrophil # : x  Auto Lymphocyte # : x  Auto Monocyte # : x  Auto Eosinophil # : x  Auto Basophil # : x  Auto Neutrophil % : x  Auto Lymphocyte % : x  Auto Monocyte % : x  Auto Eosinophil % : x  Auto Basophil % : x    10-09    141  |  105  |  8   ----------------------------<  133<H>  3.8   |  24  |  0.58    Ca    9.6      09 Oct 2019 07:59  Mg     1.8     10-09    TPro  7.8  /  Alb  4.0  /  TBili  0.3  /  DBili  x   /  AST  24  /  ALT  22  /  AlkPhos  80  10-08    CAPILLARY BLOOD GLUCOSE        PT/INR - ( 08 Oct 2019 12:10 )   PT: 12.0 sec;   INR: 1.08          PTT - ( 08 Oct 2019 12:10 )  PTT:33.2 sec      EKG:  NSR    ECHO, US:    RADIOLOGY:  MRI brain (prelim read):  The MRI examination of the brain demonstrates the ventricles, cisternal spaces, and cortical sulci to be appropriate for the patient's stated age. There is no midline shift or extra-axial collection. No abnormal signal is identified. The diffusion-weighted images demonstrate no acute ischemia. The GRE images demonstrate no hemorrhagic products. There is normal vascular flow-voids. There is a retention cyst within the right maxillary sinus. The rest of the visualized paranasal sinuses are free of mucosal disease. The mastoid air cells are well-aerated.    CTA head/neck:  No carotid stenosis.    CT head w.o contrast:  No acute intracranial hemorrhage or CT evidence of acute transcortical infarction. HPI:  Brief Hospital Course:  Pt is a 50 y/o female PMH asthma (hospitalized, never intubated), Bell's palsy, migraines, julio cesar-menopausal on OCP for hot flashes transported from [facility] with 2 days of left sided pain/numbness/tingling on 10/8. Upon arrival to [facility], NIHSS was 1. In the ED pt given aspirin 325, plavix 300, duoneb x 1, robaxin 750 po, reglan 10 IV, solumedrol 125 IV. Imaging detailed below.    Subjective:  At time of my exam and conversation with patient she had finished with PT. Reports that she started to experience LLE pain (at the hip and thigh), feels that the leg became heavy. Also reports associated SOB and chest tightness. Currently denies SOB and CP. 12 pt ROS is otherwise negative    Allergies    penicillin (Hives)    Intolerances    Home medications: Fluticasone    MEDICATIONS  (STANDING):  ALBUTerol/ipratropium for Nebulization 3 milliLiter(s) Nebulizer every 4 hours  aspirin enteric coated 81 milliGRAM(s) Oral daily  atorvastatin 80 milliGRAM(s) Oral at bedtime  buDESOnide 160 MICROgram(s)/formoterol 4.5 MICROgram(s) Inhaler 2 Puff(s) Inhalation two times a day  clopidogrel Tablet 75 milliGRAM(s) Oral daily  enoxaparin Injectable 40 milliGRAM(s) SubCutaneous every 24 hours  fluticasone propionate 50 MICROgram(s)/spray Nasal Spray 1 Spray(s) Both Nostrils two times a day    MEDICATIONS  (PRN):    Drug Dosing Weight    Weight (kg): 76.7 (08 Oct 2019 10:52)    PAST MEDICAL & SURGICAL HISTORY:  History of Bell's palsy  Asthma  H/O:   Status post gastric banding surgery: 2019    FAMILY HISTORY:  Family history of CVA: mat GM  FH: CAD (coronary artery disease)  FH: hyperlipidemia  FH: type 2 diabetes  FH: hypertension    SOCIAL HISTORY: never smoker, no EtOH use, no drug use    ADVANCE DIRECTIVES:    Vital Signs Last 24 Hrs  T(C): 36.8 (09 Oct 2019 06:50), Max: 36.8 (08 Oct 2019 13:39)  T(F): 98.2 (09 Oct 2019 06:50), Max: 98.2 (08 Oct 2019 13:39)  HR: 72 (09 Oct 2019 08:10) (68 - 91)  BP: 114/55 (09 Oct 2019 08:10) (106/59 - 149/67)  BP(mean): 76 (09 Oct 2019 08:10) (76 - 96)  ABP: --  ABP(mean): --  RR: 19 (09 Oct 2019 08:10) (16 - 19)  SpO2: 99% (09 Oct 2019 08:10) (95% - 100%)    I&O's Detail    PHYSICAL EXAM:    Constitutional: NAD, obese habitus  Eyes: PERRLA  ENMT: MMM  Neck: supple  Back: midline  Respiratory: CTA b/l  Cardiovascular: rrr, s1s2, no m/r/g  Gastrointestinal: soft, NTND, + BS  Extremities: warm  Vascular: + 2 pulses radial, no edema  Neurological: AAO x 4, 5/5 strength in all 4 extremities  Skin: no ulcer, no rash  Lymph Nodes: no LAD  Musculoskeletal: no joint swelling  Psychiatric: normal affect    LABS:  CBC Full  -  ( 09 Oct 2019 07:59 )  WBC Count : 8.33 K/uL  RBC Count : 4.39 M/uL  Hemoglobin : 12.3 g/dL  Hematocrit : 38.9 %  Platelet Count - Automated : 350 K/uL  Mean Cell Volume : 88.6 fl  Mean Cell Hemoglobin : 28.0 pg  Mean Cell Hemoglobin Concentration : 31.6 gm/dL  Auto Neutrophil # : x  Auto Lymphocyte # : x  Auto Monocyte # : x  Auto Eosinophil # : x  Auto Basophil # : x  Auto Neutrophil % : x  Auto Lymphocyte % : x  Auto Monocyte % : x  Auto Eosinophil % : x  Auto Basophil % : x    10-09    141  |  105  |  8   ----------------------------<  133<H>  3.8   |  24  |  0.58    Ca    9.6      09 Oct 2019 07:59  Mg     1.8     10-09    TPro  7.8  /  Alb  4.0  /  TBili  0.3  /  DBili  x   /  AST  24  /  ALT  22  /  AlkPhos  80  10-08    CAPILLARY BLOOD GLUCOSE        PT/INR - ( 08 Oct 2019 12:10 )   PT: 12.0 sec;   INR: 1.08          PTT - ( 08 Oct 2019 12:10 )  PTT:33.2 sec      EKG:  NSR    ECHO, US:    RADIOLOGY:  MRI brain (prelim read):  The MRI examination of the brain demonstrates the ventricles, cisternal spaces, and cortical sulci to be appropriate for the patient's stated age. There is no midline shift or extra-axial collection. No abnormal signal is identified. The diffusion-weighted images demonstrate no acute ischemia. The GRE images demonstrate no hemorrhagic products. There is normal vascular flow-voids. There is a retention cyst within the right maxillary sinus. The rest of the visualized paranasal sinuses are free of mucosal disease. The mastoid air cells are well-aerated.    CTA head/neck:  No carotid stenosis.    CT head w.o contrast:  No acute intracranial hemorrhage or CT evidence of acute transcortical infarction.

## 2019-10-09 NOTE — PHYSICAL THERAPY INITIAL EVALUATION ADULT - CRITERIA FOR SKILLED THERAPEUTIC INTERVENTIONS
impairments found/functional limitations in following categories/anticipated discharge recommendation/therapy frequency/rehab potential/risk reduction/prevention

## 2019-10-09 NOTE — DIETITIAN INITIAL EVALUATION ADULT. - ENERGY NEEDS
Stated Ht: 59in stated Wt: 158lbs IBW: 97 lbs (+/-10%), 163%IBW, BMI: 31.9 kg/m2  IBW (43.9 kg) used for calculations as pt >120%/<80% of IBW  Nutrient needs based on [facility] standards of care for adults. Needs adjusted for obesity, post-bariatric surgery.  Protein: 60-80 g protein/day  Fluids: 48-64 fl oz/day (9178-9891 mL)

## 2019-10-09 NOTE — DISCHARGE NOTE PROVIDER - NSDCCPCAREPLAN_GEN_ALL_CORE_FT
PRINCIPAL DISCHARGE DIAGNOSIS  Diagnosis: Vertebral artery dissection  Assessment and Plan of Treatment: You had a filling defect found on imaging that was performed when you came in to the hospital-this is likely consistant with the splaying of your vertebral artery. This can help explain why you have experienced weakness and numbess as well as pain and tingling. Please continue to take your ASA and plavix as perscribed and follow up with a neurologist in 2 weeks. Should you experience any change in vision, or worsening of your numbness, weakness and tingling return to the emergency room immediately.      SECONDARY DISCHARGE DIAGNOSES  Diagnosis: Migraines  Assessment and Plan of Treatment: You have a history of migrains    Diagnosis: Obesity  Assessment and Plan of Treatment: PRINCIPAL DISCHARGE DIAGNOSIS  Diagnosis: Vertebral artery dissection  Assessment and Plan of Treatment: You had a filling defect found on imaging that was performed when you came in to the hospital-this is likely consistant with the splaying of your vertebral artery. This can help explain why you have experienced weakness and numbess as well as pain and tingling. Please continue to take your ASA and plavix as perscribed and follow up with a neurologist in 2 weeks. Should you experience any change in vision, or worsening of your numbness, weakness and tingling return to the emergency room immediately. Please stop your birth control pill and follow up with your primary carer provider.      SECONDARY DISCHARGE DIAGNOSES  Diagnosis: CVA (cerebrovascular accident)  Assessment and Plan of Treatment: You were noted to have a a tiny stroke, also known as a cerebrovascular accident (CVA). This was found based on your symptom profile, and findings noted on computed tomographical imaging (CT imaging) of your brain. Please continue to take aspirin and clopidogrel as prescribed. Please follow-up with your primary care physician, and with your neurologist, Dr. Lindsay. Please continue to work with physical therapy if needed. Symptom improvement varies greatly, varying from minimal improvement to even possibly returning back to baseline with rehabilitation therapy. Should you start to experience symptoms such as but not limited to: changes in vision, changes in hearing, severe weakness/dizziness, fainting spells, or difficulties moving your eyelids or mouth, please return to the emergency department immediately for interval evaluation.      Diagnosis: Migraines  Assessment and Plan of Treatment: You have a history of migrains please follow up with your neurologist for control of this condition.    Diagnosis: Obesity  Assessment and Plan of Treatment:

## 2019-10-09 NOTE — DIETITIAN INITIAL EVALUATION ADULT. - PROBLEM SELECTOR PLAN 4
on Prempro (combination OCP) for hot flashes  hold for now  consider alternative therapies for hot flashes like SSRI  outpt f/u with Dr. Tasha Rivera

## 2019-10-09 NOTE — DIETITIAN INITIAL EVALUATION ADULT. - PROBLEM SELECTOR PLAN 1
"subtle and thin filling defect" of right V2 artery that "may be sequela of dissection, age-indeterminate with left sided numbness (face, arm, leg) persisting   -pending final read of MR brain noncon  -pending TTE  -tele monitoring  -aspirin 81, lipitor 80, plavix 75 daily  -dysphagia screen - passed  -PT/OT/SLP  -SCDs/lovenox  -TSH, A1C, lipid panel  -consider d/c of OCP -- hold for now

## 2019-10-09 NOTE — PHYSICAL THERAPY INITIAL EVALUATION ADULT - ADDITIONAL COMMENTS
pt lives w/ her partner in an elevator access apt building w/ no stairs to enter. Denies use of DME for ambulation, denies hx of recent falls. States she was independent in all functional mob prior to this admission

## 2019-10-09 NOTE — DIETITIAN INITIAL EVALUATION ADULT. - OTHER INFO
Pt seen for nutrition consult for h/o bariatric surgery. Pt is a 49 y.o F PMHx significant for s/p Lap band (04/2019), bell's palsy, asthma, julio cesar-menopausal on OCP, who presented with 2 days of left sided numbness/weakness/pain and chest tightness/SOB found to have "subtle and thin filling defect" of right V2 artery that "may be sequela of dissection, age-indeterminate," pending further workup.     Pt seen resting in bed, pleasant. Pt reports good appetite and intake PTA, reportedly very mindful of foods consumed s/p lap band (04/2019) at OSH, saw RD there once and does not f/u after surgery. Pt reports monthly visits to OSH clinic for lap band tightening. Pt reports taking premier protein shakes BID (320 kcal, 60 g protein) daily, consumes mostly egg whites, vegetables, soups daily. Denies taking micronutrient supplementation PTA. Confirms NKFA. Pt states UBW 179lbs before surgery -->now 158 lbs (21lbs wt loss post-bariatric surgery x 6 months). Pt noted w/ 168.7 lbs during this admit (10/8) and pt suspects inaccurate wt as pt weighed herself at clinic on 10/4 w/ 158lbs. Denies difficulty chewing/swallowing issues. Pt endorses continued good appetite, consuming >75% of meals slowly. No pain reported at this time. Denies N/V/D/C. +BM 10/7. Discussed importance of adequate hydration, micronutrient supplementation post-lap band. Pt receptive, appreciative and expressed understanding. Pt requests info to [facility] outpt nutrition services; info provided. RD will f/u per protocol.

## 2019-10-09 NOTE — OCCUPATIONAL THERAPY INITIAL EVALUATION ADULT - GENERAL OBSERVATIONS, REHAB EVAL
Patient cleared for OT evaluation by CASTILLO Simpson. Patient received semi-neil, NAD, +tele, +IV heplock.

## 2019-10-09 NOTE — OCCUPATIONAL THERAPY INITIAL EVALUATION ADULT - MANUAL MUSCLE TESTING RESULTS, REHAB EVAL
Right Upper Extremity 5/5, Left Upper Extremity 3+/5 throughout. Tongue protrusion/smile/eyebrow elevation symmetrical. Cheek puff weaker on left side.

## 2019-10-09 NOTE — PHYSICAL THERAPY INITIAL EVALUATION ADULT - IMPAIRMENTS FOUND, PT EVAL
gait, locomotion, and balance/muscle strength/posture/aerobic capacity/endurance/gross motor/fine motor

## 2019-10-09 NOTE — CONSULT NOTE ADULT - PROBLEM SELECTOR RECOMMENDATION 2
Management and further workup as per stroke team  -Imaging as above  -Pt currently on ASA, Plavix, Statin  -Pending Echo

## 2019-10-09 NOTE — OCCUPATIONAL THERAPY INITIAL EVALUATION ADULT - STANDING BALANCE: DYNAMIC, REHAB EVAL
good balance/patient ambulated 50 ft with supervision, no AD. Patient required 1 standing rest break as stated she was feeling short of breath. Upon returning to room, patient used door of room for support and stated her left leg was hurting and she felt like she might fall. OT provided mod A and returned patient to bed. Vitals taken and stable. Patient states she felt fatigue after functional ambulation.

## 2019-10-09 NOTE — DISCHARGE NOTE PROVIDER - CARE PROVIDER_API CALL
Krys Lindsay)  Neurology; Vascular Neurology  [address], [address] [place] [address]  Phone: [phone]  Fax: [phone]  Follow Up Time:

## 2019-10-09 NOTE — DIETITIAN INITIAL EVALUATION ADULT. - PROBLEM SELECTOR PLAN 3
recurrent migraines, worse in past 2 weeks  associated with nausea/phonophobia/photophobia  normally treats with caffeine  prn management with tylenol/caffeine

## 2019-10-09 NOTE — CONSULT NOTE ADULT - ASSESSMENT
50 y/o female PMH migraines, bell's palsy, asthma, julio cesar-menopausal on OCP who presented with 2 days of left sided numbness/weakness/pain and chest tightness/SOB found to have "subtle and thin filling defect" of right V2 artery that "may be sequela of dissection, age-indeterminate," pending further workup.

## 2019-10-09 NOTE — DISCHARGE NOTE PROVIDER - HOSPITAL COURSE
48 y/o female PMH migraines, bell's palsy, asthma, julio cesar-menopausal on OCP who presented with 2 days of left sided numbness/weakness/pain and chest tightness/SOB found to have "subtle and thin filling defect" of right V2 artery that "may be sequela of dissection, age-indeterminate," pending further workup. 50 y/o female PMH migraines, bell's palsy, asthma, julio cesar-menopausal on OCP who presented with 2 days of left sided numbness/weakness/pain and chest tightness/SOB found to have "subtle and thin filling defect" of right V2 artery that "may be sequela of dissection, age-indeterminate," admitted for stroke and vertebral artery.              Problem list        1) Vertebral artery dissection-Patient found to have vertebral artery dissection upon admission with weakness and numbness on L side, no invasive intervention necessary, Patient started on DAPT and lipitor.         2) CVA- MRI showing pinpoint cerebellar stroke no coordination deficits on physical exam. DAPT, statin        New meds        1) ASA 81mg, Plavix 75mg, Statin 80        Labs to be followed out patient    -none

## 2019-10-09 NOTE — OCCUPATIONAL THERAPY INITIAL EVALUATION ADULT - COORDINATION ASSESSED, REHAB EVAL
finger to nose/Right Upper Extremity WNL/LUE WFL (increased time and effort) Right Upper Extremity WNL/LUE WFL (increased time and effort)/finger to nose

## 2019-10-09 NOTE — DIETITIAN INITIAL EVALUATION ADULT. - PROBLEM SELECTOR PLAN 7
1) PCP Contacted on Admission: (No) --> Name & Phone #: Garrison Nielsen ([phone])  2) Date of Contact with PCP:   3) PCP Contacted at Discharge: (Y/N, N/A)  4) Summary of Handoff Given to PCP:   5) Post-Discharge Appointment Date and Location:

## 2019-10-09 NOTE — PHYSICAL THERAPY INITIAL EVALUATION ADULT - PERTINENT HX OF CURRENT PROBLEM, REHAB EVAL
48 y/o female PMH migraines, bell's palsy, asthma, julio cesar-menopausal on OCP who presented with 2 days of left sided numbness/weakness/pain and chest tightness/SOB found to have "subtle and thin filling defect" of right V2 artery that "may be sequela of dissection, age-indeterminate," pending further workup.

## 2019-10-09 NOTE — OCCUPATIONAL THERAPY INITIAL EVALUATION ADULT - PERTINENT HX OF CURRENT PROBLEM, REHAB EVAL
Pt is a 48 y/o female PMH asthma (hospitalized, never intubated), Bell's palsy, migraines, julio cesar-menopausal on OCP for hot flashes transported from [facility] with 2 days of left sided pain/numbness/tingling on 10/8.

## 2019-10-10 ENCOUNTER — INBOUND DOCUMENT (OUTPATIENT)
Age: 49
End: 2019-10-10

## 2019-10-22 ENCOUNTER — APPOINTMENT (OUTPATIENT)
Dept: OBGYN | Facility: CLINIC | Age: 49
End: 2019-10-22
Payer: COMMERCIAL

## 2019-10-22 VITALS — HEIGHT: 59 IN | BODY MASS INDEX: 31.85 KG/M2 | WEIGHT: 158 LBS

## 2019-10-22 PROBLEM — J45.909 UNSPECIFIED ASTHMA, UNCOMPLICATED: Chronic | Status: ACTIVE | Noted: 2019-10-08

## 2019-10-22 PROBLEM — Z86.69 PERSONAL HISTORY OF OTHER DISEASES OF THE NERVOUS SYSTEM AND SENSE ORGANS: Chronic | Status: ACTIVE | Noted: 2019-10-09

## 2019-10-22 PROCEDURE — 99214 OFFICE O/P EST MOD 30 MIN: CPT

## 2019-10-22 RX ORDER — PAROXETINE 7.5 MG/1
7.5 CAPSULE ORAL DAILY
Qty: 90 | Refills: 3 | Status: ACTIVE | COMMUNITY
Start: 2019-10-22 | End: 1900-01-01

## 2019-10-22 RX ORDER — CONJUGATED ESTROGENS AND MEDROXYPROGESTERONE ACETATE .45; 1.5 MG/1; MG/1
0.45-1.5 TABLET, SUGAR COATED ORAL DAILY
Qty: 3 | Refills: 3 | Status: DISCONTINUED | COMMUNITY
Start: 2019-05-29 | End: 2019-10-22

## 2019-12-04 ENCOUNTER — EMERGENCY (EMERGENCY)
Facility: HOSPITAL | Age: 49
LOS: 1 days | Discharge: HOME | End: 2019-12-04
Attending: EMERGENCY MEDICINE | Admitting: EMERGENCY MEDICINE
Payer: COMMERCIAL

## 2019-12-04 VITALS
DIASTOLIC BLOOD PRESSURE: 72 MMHG | HEART RATE: 64 BPM | RESPIRATION RATE: 18 BRPM | SYSTOLIC BLOOD PRESSURE: 132 MMHG | TEMPERATURE: 98 F | OXYGEN SATURATION: 98 %

## 2019-12-04 VITALS
HEIGHT: 59 IN | DIASTOLIC BLOOD PRESSURE: 89 MMHG | TEMPERATURE: 98 F | HEART RATE: 86 BPM | WEIGHT: 160.06 LBS | SYSTOLIC BLOOD PRESSURE: 137 MMHG | OXYGEN SATURATION: 97 % | RESPIRATION RATE: 18 BRPM

## 2019-12-04 DIAGNOSIS — Z98.891 HISTORY OF UTERINE SCAR FROM PREVIOUS SURGERY: Chronic | ICD-10-CM

## 2019-12-04 DIAGNOSIS — Z98.84 BARIATRIC SURGERY STATUS: Chronic | ICD-10-CM

## 2019-12-04 LAB
ALBUMIN SERPL ELPH-MCNC: 5 G/DL — SIGNIFICANT CHANGE UP (ref 3.3–5)
ALP SERPL-CCNC: 95 U/L — SIGNIFICANT CHANGE UP (ref 40–120)
ALT FLD-CCNC: 21 U/L — SIGNIFICANT CHANGE UP (ref 10–45)
ANION GAP SERPL CALC-SCNC: 12 MMOL/L — SIGNIFICANT CHANGE UP (ref 5–17)
APPEARANCE UR: CLEAR — SIGNIFICANT CHANGE UP
APTT BLD: 34.4 SEC — SIGNIFICANT CHANGE UP (ref 27.5–36.3)
AST SERPL-CCNC: 20 U/L — SIGNIFICANT CHANGE UP (ref 10–40)
BASOPHILS # BLD AUTO: 0.02 K/UL — SIGNIFICANT CHANGE UP (ref 0–0.2)
BASOPHILS NFR BLD AUTO: 0.2 % — SIGNIFICANT CHANGE UP (ref 0–2)
BILIRUB SERPL-MCNC: 0.5 MG/DL — SIGNIFICANT CHANGE UP (ref 0.2–1.2)
BILIRUB UR-MCNC: NEGATIVE — SIGNIFICANT CHANGE UP
BUN SERPL-MCNC: 7 MG/DL — SIGNIFICANT CHANGE UP (ref 7–23)
CALCIUM SERPL-MCNC: 9.8 MG/DL — SIGNIFICANT CHANGE UP (ref 8.4–10.5)
CHLORIDE SERPL-SCNC: 104 MMOL/L — SIGNIFICANT CHANGE UP (ref 96–108)
CK MB CFR SERPL CALC: 1.2 NG/ML — SIGNIFICANT CHANGE UP (ref 0–6.7)
CK SERPL-CCNC: 110 U/L — SIGNIFICANT CHANGE UP (ref 25–170)
CO2 SERPL-SCNC: 24 MMOL/L — SIGNIFICANT CHANGE UP (ref 22–31)
COLOR SPEC: YELLOW — SIGNIFICANT CHANGE UP
CREAT SERPL-MCNC: 0.52 MG/DL — SIGNIFICANT CHANGE UP (ref 0.5–1.3)
DIFF PNL FLD: NEGATIVE — SIGNIFICANT CHANGE UP
EOSINOPHIL # BLD AUTO: 0 K/UL — SIGNIFICANT CHANGE UP (ref 0–0.5)
EOSINOPHIL NFR BLD AUTO: 0 % — SIGNIFICANT CHANGE UP (ref 0–6)
GLUCOSE SERPL-MCNC: 110 MG/DL — HIGH (ref 70–99)
GLUCOSE UR QL: 250
HCG UR QL: NEGATIVE — SIGNIFICANT CHANGE UP
HCT VFR BLD CALC: 42.8 % — SIGNIFICANT CHANGE UP (ref 34.5–45)
HGB BLD-MCNC: 13.6 G/DL — SIGNIFICANT CHANGE UP (ref 11.5–15.5)
IMM GRANULOCYTES NFR BLD AUTO: 0.2 % — SIGNIFICANT CHANGE UP (ref 0–1.5)
INR BLD: 1.08 — SIGNIFICANT CHANGE UP (ref 0.88–1.16)
KETONES UR-MCNC: NEGATIVE — SIGNIFICANT CHANGE UP
LEUKOCYTE ESTERASE UR-ACNC: NEGATIVE — SIGNIFICANT CHANGE UP
LYMPHOCYTES # BLD AUTO: 0.75 K/UL — LOW (ref 1–3.3)
LYMPHOCYTES # BLD AUTO: 8.9 % — LOW (ref 13–44)
MAGNESIUM SERPL-MCNC: 2 MG/DL — SIGNIFICANT CHANGE UP (ref 1.6–2.6)
MCHC RBC-ENTMCNC: 28.6 PG — SIGNIFICANT CHANGE UP (ref 27–34)
MCHC RBC-ENTMCNC: 31.8 GM/DL — LOW (ref 32–36)
MCV RBC AUTO: 89.9 FL — SIGNIFICANT CHANGE UP (ref 80–100)
MONOCYTES # BLD AUTO: 0.25 K/UL — SIGNIFICANT CHANGE UP (ref 0–0.9)
MONOCYTES NFR BLD AUTO: 3 % — SIGNIFICANT CHANGE UP (ref 2–14)
NEUTROPHILS # BLD AUTO: 7.35 K/UL — SIGNIFICANT CHANGE UP (ref 1.8–7.4)
NEUTROPHILS NFR BLD AUTO: 87.7 % — HIGH (ref 43–77)
NITRITE UR-MCNC: NEGATIVE — SIGNIFICANT CHANGE UP
NRBC # BLD: 0 /100 WBCS — SIGNIFICANT CHANGE UP (ref 0–0)
NT-PROBNP SERPL-SCNC: 51 PG/ML — SIGNIFICANT CHANGE UP (ref 0–300)
PH UR: 6.5 — SIGNIFICANT CHANGE UP (ref 5–8)
PLATELET # BLD AUTO: 382 K/UL — SIGNIFICANT CHANGE UP (ref 150–400)
POTASSIUM SERPL-MCNC: 3.7 MMOL/L — SIGNIFICANT CHANGE UP (ref 3.5–5.3)
POTASSIUM SERPL-SCNC: 3.7 MMOL/L — SIGNIFICANT CHANGE UP (ref 3.5–5.3)
PROT SERPL-MCNC: 8.3 G/DL — SIGNIFICANT CHANGE UP (ref 6–8.3)
PROT UR-MCNC: NEGATIVE MG/DL — SIGNIFICANT CHANGE UP
PROTHROM AB SERPL-ACNC: 12.2 SEC — SIGNIFICANT CHANGE UP (ref 10–12.9)
RBC # BLD: 4.76 M/UL — SIGNIFICANT CHANGE UP (ref 3.8–5.2)
RBC # FLD: 14.8 % — HIGH (ref 10.3–14.5)
SODIUM SERPL-SCNC: 140 MMOL/L — SIGNIFICANT CHANGE UP (ref 135–145)
SP GR SPEC: 1.01 — SIGNIFICANT CHANGE UP (ref 1–1.03)
TROPONIN T SERPL-MCNC: <0.01 NG/ML — SIGNIFICANT CHANGE UP (ref 0–0.01)
UROBILINOGEN FLD QL: 0.2 E.U./DL — SIGNIFICANT CHANGE UP
WBC # BLD: 8.39 K/UL — SIGNIFICANT CHANGE UP (ref 3.8–10.5)
WBC # FLD AUTO: 8.39 K/UL — SIGNIFICANT CHANGE UP (ref 3.8–10.5)

## 2019-12-04 PROCEDURE — 93010 ELECTROCARDIOGRAM REPORT: CPT

## 2019-12-04 PROCEDURE — 70450 CT HEAD/BRAIN W/O DYE: CPT

## 2019-12-04 PROCEDURE — 93005 ELECTROCARDIOGRAM TRACING: CPT

## 2019-12-04 PROCEDURE — 70496 CT ANGIOGRAPHY HEAD: CPT | Mod: 26

## 2019-12-04 PROCEDURE — 82550 ASSAY OF CK (CPK): CPT

## 2019-12-04 PROCEDURE — 85610 PROTHROMBIN TIME: CPT

## 2019-12-04 PROCEDURE — 70498 CT ANGIOGRAPHY NECK: CPT

## 2019-12-04 PROCEDURE — 36415 COLL VENOUS BLD VENIPUNCTURE: CPT

## 2019-12-04 PROCEDURE — 85025 COMPLETE CBC W/AUTO DIFF WBC: CPT

## 2019-12-04 PROCEDURE — 83735 ASSAY OF MAGNESIUM: CPT

## 2019-12-04 PROCEDURE — 84484 ASSAY OF TROPONIN QUANT: CPT

## 2019-12-04 PROCEDURE — 70498 CT ANGIOGRAPHY NECK: CPT | Mod: 26

## 2019-12-04 PROCEDURE — 82553 CREATINE MB FRACTION: CPT

## 2019-12-04 PROCEDURE — 81025 URINE PREGNANCY TEST: CPT

## 2019-12-04 PROCEDURE — 85730 THROMBOPLASTIN TIME PARTIAL: CPT

## 2019-12-04 PROCEDURE — 70496 CT ANGIOGRAPHY HEAD: CPT

## 2019-12-04 PROCEDURE — 83880 ASSAY OF NATRIURETIC PEPTIDE: CPT

## 2019-12-04 PROCEDURE — 80053 COMPREHEN METABOLIC PANEL: CPT

## 2019-12-04 PROCEDURE — 81003 URINALYSIS AUTO W/O SCOPE: CPT

## 2019-12-04 PROCEDURE — 99284 EMERGENCY DEPT VISIT MOD MDM: CPT

## 2019-12-04 PROCEDURE — 99284 EMERGENCY DEPT VISIT MOD MDM: CPT | Mod: 25

## 2019-12-04 PROCEDURE — 70450 CT HEAD/BRAIN W/O DYE: CPT | Mod: 26,59

## 2019-12-04 NOTE — ED PROVIDER NOTE - OBJECTIVE STATEMENT
50 y/o female PMH migraines, bell's palsy, asthma, Lap band may 2019, julio cesar-menopause, msk pain, hx of left sided numbness/weakness/pain in October 2019 and diagnosed with small vertebral artery dissection, now on asa and plavix and compliant who p/w persistent msk pain, left neck, arm, chest back. Pt states she has been having these pain for weeks, basically since she was DC from the hospital. She was in [place] for the holidays and returned tonight at 11pm and came directly to the ED from the airport. No f/c, no recent trauma or fall, no n/v, no cp/sob. Pt has been complaint with her meds. 50 y/o female PMH migraines, bell's palsy, asthma, Lap band may 2019, julio cesar-menopause, msk pain, hx of left sided numbness/weakness/pain in October 2019 and diagnosed with small vertebral artery dissection, now on asa and plavix and compliant who p/w persistent msk pain, left neck, arm, chest back. Pt states she has been having these pain for weeks, basically since she was DC'd from the hospital. She was in [place] for the holidays and returned tonight at 11pm and came directly to the ED from the airport. No f/c, no recent trauma or fall, no n/v, no cp/sob. Pt has been complaint with her meds.

## 2019-12-04 NOTE — ED PROVIDER NOTE - PATIENT PORTAL LINK FT
You can access the FollowMyHealth Patient Portal offered by [facility] by registering at the following website: [email or web address]. By joining [facility]’s FollowMyHealth portal, you will also be able to view your health information using other applications (apps) compatible with our system.

## 2019-12-04 NOTE — ED PROVIDER NOTE - PHYSICAL EXAMINATION
GEN: Well appearing, well nourished, awake, alert, oriented to person, place, time/situation and in no apparent distress. NTAF  ENT: Airway patent, Nasal mucosa clear. Mouth with normal mucosa.  EYES: Clear bilaterally.  RESPIRATORY: Breathing comfortably with normal RR.  CARDIAC: Regular rate and rhythm  ABDOMEN: Soft, nontender, +bowel sounds, no rebound, rigidity, or guarding.  MSK: Range of motion is not limited, no deformities noted.  NEURO: Alert and oriented x 3. Cn 2-12 intact. Left arm weakness 4/5 - pt reports this is unchanged. Otherwise Strength 5/5 and sensation intact in all 4 extremities. no pronator drift. FTN normal. Gait normal.   SKIN: Skin normal color for race, warm, dry and intact. No evidence of rash.  PSYCH: Alert and oriented to person, place, time/situation. anxious mood and affect. no apparent risk to self or others.

## 2019-12-04 NOTE — ED PROVIDER NOTE - CLINICAL SUMMARY MEDICAL DECISION MAKING FREE TEXT BOX
50 y/o female PMH migraines, bell's palsy, asthma, Lap band may 2019, julio cesar-menopause, msk pain, hx of left sided numbness/weakness/pain in October 2019 and diagnosed with small vertebral artery dissection, now on asa and plavix and compliant who p/w persistent msk pain, left neck, arm, chest back -- these sx have been persistent since her DC from the hospital. No new symptoms or change in sx to warrant code stroke. However, given hx will repeat CTs and check labs. MEdication for pain. Labs and CT unremarkable. No change. Pt was reassured, she will f/u with neuro and will her gyn. The patient is stable for DC and is feeling much better.  Symptoms have improved and after discussion regarding discharge, patient feels comfortable going home.  Answers to all questions provided.  Patient feeling satisfactory with care and follow up plan discussed. They were advised to call their PMD for prompt outpatient follow up. Return precautions were discussed. The patient was advised to return to the ER for any concerning or worsening symptoms.

## 2019-12-04 NOTE — ED ADULT NURSE NOTE - OBJECTIVE STATEMENT
48y/o female received into the ED, axox3, stating that yesterday at 1pm she began to feel a headache, numbness to the left arm and that an intense "piercing pain" down the left arm.  Pt. states that she is experiencing on and off weakness, sob and blurry vision.  At this moment she does not present with any of these symptoms.  Pt. has FROM to all four limbs, no residual weakness.  Pt. states that she still feels numbness to the left arm.  Pt. has history of stroke in 10/19, T&R from [facility].

## 2019-12-04 NOTE — ED ADULT TRIAGE NOTE - CHIEF COMPLAINT QUOTE
pt states "I have chest tightness, some chest pain, pain/numbness in my left arm, severe hot flash, joint pain, neck pain, headache and dizziness on and off x 1 week. I also have been experiencing anxiety and panic attacks since my stroke last Oct. I have an appointment to see a psychiatrist. My MD thinks all this is related to me going through menopause." Denies change in gait/balance, no change in vision

## 2019-12-04 NOTE — ED ADULT NURSE NOTE - EENT ASSESSMENT, MLM
We have reviewed your prescription medications. We will give you a steroid shot to treat your bronchitis. I also recommend that you take the breo daily. Use the albuterol inhaler for rescue. If your cough does not get better, try adding back the nexium. Reflux can sometimes cause cough. I am checking routine blood work.    WDL

## 2019-12-04 NOTE — ED PROVIDER NOTE - NSFOLLOWUPINSTRUCTIONS_ED_ALL_ED_FT
Please follow up with your primary care physician and your neurologist. If you have any problem getting an appointment this week, please call the ED Referral Coordinator at [phone].  Return to the Emergency Department if you have any new or worsening symptoms, or for any other concerns. Please read below for further information.    Musculoskeletal Pain  Musculoskeletal pain refers to aches and pains in your bones, joints, muscles, and the tissues that surround them. This pain can occur in any part of the body. It can last for a short time (acute) or a long time (chronic).    A physical exam, lab tests, and imaging studies may be done to find the cause of your musculoskeletal pain.    Follow these instructions at home:  Lifestyle     Try to control or lower your stress levels. Stress increases muscle tension and can worsen musculoskeletal pain. It is important to recognize when you are anxious or stressed and learn ways to manage it. This may include:    Meditation or yoga.  Cognitive or behavioral therapy.  Acupuncture or massage therapy.    You may continue all activities unless the activities cause more pain. When the pain gets better, slowly resume your normal activities. Gradually increase the intensity and duration of your activities or exercise.  Managing pain, stiffness, and swelling     Take over-the-counter and prescription medicines only as told by your health care provider.  When your pain is severe, bed rest may be helpful. Lie or sit in any position that is comfortable, but get out of bed and walk around at least every couple of hours.  If directed, apply heat to the affected area as often as told by your health care provider. Use the heat source that your health care provider recommends, such as a moist heat pack or a heating pad.    Place a towel between your skin and the heat source.  Leave the heat on for 20–30 minutes.  Remove the heat if your skin turns bright red. This is especially important if you are unable to feel pain, heat, or cold. You may have a greater risk of getting burned.    If directed, put ice on the painful area.    Put ice in a plastic bag.  Place a towel between your skin and the bag.  Leave the ice on for 20 minutes, 2–3 times a day.    General instructions:  Your health care provider may recommend that you see a physical therapist. This person can help you come up with a safe exercise program. Do any exercises as told by your physical therapist.  Keep all follow-up visits, including any physical therapy visits, as told by your health care providers. This is important.  Contact a health care provider if:  Your pain gets worse.  Medicines do not help ease your pain.  You cannot use the part of your body that hurts, such as your arm, leg, or neck.  You have trouble sleeping.  You have trouble doing your normal activities.  Get help right away if:  You have a new injury and your pain is worse or different.  You feel numb or you have tingling in the painful area.  Summary  Musculoskeletal pain refers to aches and pains in your bones, joints, muscles, and the tissues that surround them.  This pain can occur in any part of the body.  Your health care provider may recommend that you see a physical therapist. This person can help you come up with a safe exercise program. Do any exercises as told by your physical therapist.  Lower your stress level. Stress can worsen musculoskeletal pain. Ways to lower stress may include meditation, yoga, cognitive or behavioral therapy, acupuncture, and massage therapy.  This information is not intended to replace advice given to you by your health care provider. Make sure you discuss any questions you have with your health care provider.

## 2019-12-13 ENCOUNTER — APPOINTMENT (OUTPATIENT)
Dept: OBGYN | Facility: CLINIC | Age: 49
End: 2019-12-13
Payer: COMMERCIAL

## 2019-12-13 VITALS
BODY MASS INDEX: 32.25 KG/M2 | WEIGHT: 160 LBS | SYSTOLIC BLOOD PRESSURE: 125 MMHG | HEIGHT: 59 IN | DIASTOLIC BLOOD PRESSURE: 80 MMHG

## 2019-12-13 DIAGNOSIS — Z79.82 LONG TERM (CURRENT) USE OF ASPIRIN: ICD-10-CM

## 2019-12-13 DIAGNOSIS — M62.81 MUSCLE WEAKNESS (GENERALIZED): ICD-10-CM

## 2019-12-13 DIAGNOSIS — J45.909 UNSPECIFIED ASTHMA, UNCOMPLICATED: ICD-10-CM

## 2019-12-13 DIAGNOSIS — M54.9 DORSALGIA, UNSPECIFIED: ICD-10-CM

## 2019-12-13 DIAGNOSIS — Z88.0 ALLERGY STATUS TO PENICILLIN: ICD-10-CM

## 2019-12-13 DIAGNOSIS — R07.89 OTHER CHEST PAIN: ICD-10-CM

## 2019-12-13 DIAGNOSIS — M54.2 CERVICALGIA: ICD-10-CM

## 2019-12-13 DIAGNOSIS — M79.602 PAIN IN LEFT ARM: ICD-10-CM

## 2019-12-13 DIAGNOSIS — Z79.899 OTHER LONG TERM (CURRENT) DRUG THERAPY: ICD-10-CM

## 2019-12-13 PROCEDURE — 99214 OFFICE O/P EST MOD 30 MIN: CPT

## 2021-10-29 ENCOUNTER — APPOINTMENT (OUTPATIENT)
Dept: OBGYN | Facility: CLINIC | Age: 51
End: 2021-10-29
Payer: COMMERCIAL

## 2021-10-29 VITALS — WEIGHT: 157 LBS | DIASTOLIC BLOOD PRESSURE: 84 MMHG | SYSTOLIC BLOOD PRESSURE: 120 MMHG

## 2021-10-29 DIAGNOSIS — N64.4 MASTODYNIA: ICD-10-CM

## 2021-10-29 DIAGNOSIS — N60.19 DIFFUSE CYSTIC MASTOPATHY OF UNSPECIFIED BREAST: ICD-10-CM

## 2021-10-29 PROCEDURE — 99396 PREV VISIT EST AGE 40-64: CPT

## 2021-10-29 NOTE — PHYSICAL EXAM
[Chaperone Present] : A chaperone was present in the examining room during all aspects of the physical examination [Appropriately responsive] : appropriately responsive [Alert] : alert [No Acute Distress] : no acute distress [No Lymphadenopathy] : no lymphadenopathy [Regular Rate Rhythm] : regular rate rhythm [No Murmurs] : no murmurs [Clear to Auscultation B/L] : clear to auscultation bilaterally [Soft] : soft [Non-tender] : non-tender [Non-distended] : non-distended [No HSM] : No HSM [No Lesions] : no lesions [No Mass] : no mass [Oriented x3] : oriented x3 [Examination Of The Breasts] : a normal appearance [Breast Palpation Diffuse Fibrous Tissue Bilateral] : fibrocystic changes [No Masses] : no breast masses were palpable [Labia Majora] : normal [Labia Minora] : normal [Normal] : normal [Enlarged ___ wks] : enlarged [unfilled] ~Uweeks [Anteversion] : anteverted [Uterine Adnexae] : normal

## 2021-10-29 NOTE — HISTORY OF PRESENT ILLNESS
[postmenopausal] : postmenopausal [Currently In Menopause] : currently in menopause [Post-Menopause, No Sxs] : post-menopausal, currently without symptoms [Yes] : Patient has concerns regarding sex [Previously active] : previously active [Men] : men [No] : No [FreeTextEntry1] : Painful and uncomfortable during dryness.

## 2022-01-10 LAB — CYTOLOGY CVX/VAG DOC THIN PREP: ABNORMAL

## 2023-04-12 ENCOUNTER — APPOINTMENT (OUTPATIENT)
Dept: OBGYN | Facility: CLINIC | Age: 53
End: 2023-04-12